# Patient Record
Sex: FEMALE | Race: WHITE
[De-identification: names, ages, dates, MRNs, and addresses within clinical notes are randomized per-mention and may not be internally consistent; named-entity substitution may affect disease eponyms.]

---

## 2020-02-14 ENCOUNTER — HOSPITAL ENCOUNTER (INPATIENT)
Dept: HOSPITAL 92 - ERS | Age: 50
LOS: 2 days | Discharge: HOME | DRG: 641 | End: 2020-02-16
Attending: INTERNAL MEDICINE | Admitting: INTERNAL MEDICINE
Payer: COMMERCIAL

## 2020-02-14 VITALS — BODY MASS INDEX: 25.2 KG/M2

## 2020-02-14 DIAGNOSIS — F41.9: ICD-10-CM

## 2020-02-14 DIAGNOSIS — Z91.040: ICD-10-CM

## 2020-02-14 DIAGNOSIS — Z90.710: ICD-10-CM

## 2020-02-14 DIAGNOSIS — E87.3: ICD-10-CM

## 2020-02-14 DIAGNOSIS — F32.9: ICD-10-CM

## 2020-02-14 DIAGNOSIS — K21.9: ICD-10-CM

## 2020-02-14 DIAGNOSIS — E83.39: ICD-10-CM

## 2020-02-14 DIAGNOSIS — D64.9: ICD-10-CM

## 2020-02-14 DIAGNOSIS — Z88.5: ICD-10-CM

## 2020-02-14 DIAGNOSIS — E03.9: ICD-10-CM

## 2020-02-14 DIAGNOSIS — Z79.899: ICD-10-CM

## 2020-02-14 DIAGNOSIS — Z91.09: ICD-10-CM

## 2020-02-14 DIAGNOSIS — Z90.49: ICD-10-CM

## 2020-02-14 DIAGNOSIS — E78.5: ICD-10-CM

## 2020-02-14 DIAGNOSIS — E87.6: Primary | ICD-10-CM

## 2020-02-14 DIAGNOSIS — R03.1: ICD-10-CM

## 2020-02-14 LAB
ALBUMIN SERPL BCG-MCNC: 4.4 G/DL (ref 3.5–5)
ALP SERPL-CCNC: 94 U/L (ref 40–110)
ALT SERPL W P-5'-P-CCNC: 20 U/L (ref 8–55)
ANION GAP SERPL CALC-SCNC: 11 MMOL/L (ref 10–20)
ANION GAP SERPL CALC-SCNC: 12 MMOL/L (ref 10–20)
AST SERPL-CCNC: 21 U/L (ref 5–34)
BASOPHILS # BLD AUTO: 0.1 THOU/UL (ref 0–0.2)
BASOPHILS NFR BLD AUTO: 0.8 % (ref 0–1)
BILIRUB SERPL-MCNC: 0.3 MG/DL (ref 0.2–1.2)
BUN SERPL-MCNC: 11 MG/DL (ref 7–18.7)
BUN SERPL-MCNC: 12 MG/DL (ref 7–18.7)
CALCIUM SERPL-MCNC: 9 MG/DL (ref 7.8–10.44)
CALCIUM SERPL-MCNC: 9.6 MG/DL (ref 7.8–10.44)
CHLORIDE SERPL-SCNC: 98 MMOL/L (ref 98–107)
CHLORIDE SERPL-SCNC: 99 MMOL/L (ref 98–107)
CO2 SERPL-SCNC: 28 MMOL/L (ref 22–29)
CO2 SERPL-SCNC: 30 MMOL/L (ref 22–29)
CREAT CL PREDICTED SERPL C-G-VRATE: 0 ML/MIN (ref 70–130)
CREAT CL PREDICTED SERPL C-G-VRATE: 0 ML/MIN (ref 70–130)
EOSINOPHIL # BLD AUTO: 0.2 THOU/UL (ref 0–0.7)
EOSINOPHIL NFR BLD AUTO: 2.4 % (ref 0–10)
GLOBULIN SER CALC-MCNC: 2.9 G/DL (ref 2.4–3.5)
GLUCOSE SERPL-MCNC: 125 MG/DL (ref 70–105)
GLUCOSE SERPL-MCNC: 92 MG/DL (ref 70–105)
HGB BLD-MCNC: 13.7 G/DL (ref 12–16)
LYMPHOCYTES # BLD: 2.7 THOU/UL (ref 1.2–3.4)
LYMPHOCYTES NFR BLD AUTO: 29.8 % (ref 21–51)
MCH RBC QN AUTO: 32.3 PG (ref 27–31)
MCV RBC AUTO: 94 FL (ref 78–98)
MONOCYTES # BLD AUTO: 0.9 THOU/UL (ref 0.11–0.59)
MONOCYTES NFR BLD AUTO: 10.1 % (ref 0–10)
NEUTROPHILS # BLD AUTO: 5.1 THOU/UL (ref 1.4–6.5)
NEUTROPHILS NFR BLD AUTO: 56.9 % (ref 42–75)
PLATELET # BLD AUTO: 346 THOU/UL (ref 130–400)
POTASSIUM SERPL-SCNC: 2.1 MMOL/L (ref 3.5–5.1)
POTASSIUM SERPL-SCNC: 2.3 MMOL/L (ref 3.5–5.1)
POTASSIUM SERPL-SCNC: 2.3 MMOL/L (ref 3.5–5.1)
POTASSIUM SERPL-SCNC: 2.6 MMOL/L (ref 3.5–5.1)
RBC # BLD AUTO: 4.24 MILL/UL (ref 4.2–5.4)
SODIUM SERPL-SCNC: 137 MMOL/L (ref 136–145)
SODIUM SERPL-SCNC: 137 MMOL/L (ref 136–145)
TROPONIN I SERPL DL<=0.01 NG/ML-MCNC: (no result) NG/ML (ref ?–0.03)
TROPONIN I SERPL DL<=0.01 NG/ML-MCNC: (no result) NG/ML (ref ?–0.03)
WBC # BLD AUTO: 9 THOU/UL (ref 4.8–10.8)

## 2020-02-14 PROCEDURE — 83735 ASSAY OF MAGNESIUM: CPT

## 2020-02-14 PROCEDURE — 71045 X-RAY EXAM CHEST 1 VIEW: CPT

## 2020-02-14 PROCEDURE — 84132 ASSAY OF SERUM POTASSIUM: CPT

## 2020-02-14 PROCEDURE — 84133 ASSAY OF URINE POTASSIUM: CPT

## 2020-02-14 PROCEDURE — 85025 COMPLETE CBC W/AUTO DIFF WBC: CPT

## 2020-02-14 PROCEDURE — 84484 ASSAY OF TROPONIN QUANT: CPT

## 2020-02-14 PROCEDURE — 82570 ASSAY OF URINE CREATININE: CPT

## 2020-02-14 PROCEDURE — 93005 ELECTROCARDIOGRAM TRACING: CPT

## 2020-02-14 PROCEDURE — 36415 COLL VENOUS BLD VENIPUNCTURE: CPT

## 2020-02-14 PROCEDURE — 84244 ASSAY OF RENIN: CPT

## 2020-02-14 PROCEDURE — 80048 BASIC METABOLIC PNL TOTAL CA: CPT

## 2020-02-14 PROCEDURE — 82436 ASSAY OF URINE CHLORIDE: CPT

## 2020-02-14 PROCEDURE — 82340 ASSAY OF CALCIUM IN URINE: CPT

## 2020-02-14 PROCEDURE — 84100 ASSAY OF PHOSPHORUS: CPT

## 2020-02-14 PROCEDURE — 82088 ASSAY OF ALDOSTERONE: CPT

## 2020-02-14 RX ADMIN — POTASSIUM CHLORIDE SCH MLS: 2 INJECTION, SOLUTION, CONCENTRATE INTRAVENOUS at 22:21

## 2020-02-14 NOTE — RAD
XR Chest 1 View Portable



HISTORY: Dyspnea



COMPARISON: 9/19/2012



FINDINGS: The heart size is normal. The lungs are well expanded without focal areas of consolidation,
 pneumothorax or pleural effusions.



IMPRESSION: No radiographic evidence of acute cardiopulmonary process.



Reported By: Renan Correa 

Electronically Signed:  2/14/2020 1:58 PM

## 2020-02-14 NOTE — HP
PRIMARY CARE PROVIDER:  Dr. Wiliam Mcguire.



CHIEF COMPLAINT:  Low potassium.



HISTORY OF PRESENT ILLNESS:  Ms. Ewelina Mccracken is a pleasant 49-year-old lady,

who was seen at [facility] on February 14, 2020, after she

was sent to the emergency room by her nephrologist. 



She reports that over the last several months, she has been feeling generalized

weakness.  She denies any other complaints.  She was seen by Nephrology Service as

outpatient and was found to have low potassium.  She had investigations done to look

into the etiology of low potassium, but so far there is no clear etiology.  She was

found to have ongoing low potassium and was advised by her nephrologist to go to the

emergency room. 



REVIEW OF SYSTEMS:  All systems were reviewed and found to be negative except for

the pertinent positives mentioned above. 



PAST MEDICAL HISTORY:  

1. Anxiety.

2. Hypothyroidism.



PAST SURGICAL HISTORY:  

1. Cholecystectomy.

2. Hysterectomy.



PSYCHIATRIC HISTORY:  

1. Anxiety.

2. Depression.



SOCIAL HISTORY:  The patient is an ex-smoker.  She denies alcohol use or

recreational drug use. 



FAMILY HISTORY:  Significant for diabetes mellitus.



ALLERGIES:  MORPHINE, ADHESIVE, AND LATEX GLOVES.



CURRENT MEDICATIONS:  

1. Pantoprazole 40 mg daily.

2. Atorvastatin 10 mg at bedtime.

3. Sertraline 50 mg daily.

4. Ranitidine 150 mg daily.

5. Bupropion 150 mg daily.



PHYSICAL EXAMINATION:

GENERAL:  On examination, Ms. Ewelina Mccracken is awake and alert, not in acute

distress. 

VITAL SIGNS:  Blood pressure is 105/67, pulse 77, respiratory rate 16, and oxygen

saturation 98% on room air.  She is afebrile. 

EYES:  No scleral icterus.  No conjunctival pallor. 

ENT:  Moist mucosal membranes.  No oropharyngeal erythema or exudates. 

NECK:  Supple, nontender.  Trachea is midline. 

RESPIRATORY:  Accessory muscles of breathing are not active.  Chest wall movements

are symmetric bilaterally.  Lungs are clear to auscultation without wheeze, rhonchi,

or crepitations. 

CARDIOVASCULAR:  S1 and S2 are heard, regular.  Peripheral pulses palpable. 

ABDOMEN:  Soft, nontender.  Bowel sounds are heard. 

NEUROLOGIC:  Cranial nerves 2 through 12 are intact. 

MUSCULOSKELETAL:  Power is 5/5 in all 4 extremities. 

SKIN:  No rashes or subcutaneous nodules. 

LYMPHATIC:  No cervical lymphadenopathy. 

PSYCHIATRIC:  Normal mood, normal affect.  The patient is oriented to person, place,

and time. 



LABORATORY AND DIAGNOSTIC DATA:  Ms. Ewelina Mccracken's labs and investigations

were reviewed. 





Electrocardiogram by my review shows normal sinus rhythm, with lateral ST-segment

depressions. 



Chest x-ray by my review does not show any pulmonary infiltrates. 



She has unremarkable CBC, normal sodium, decreased potassium of 2.1, elevated carbon

dioxide of 30, and unremarkable LFTs.  Magnesium is normal at 2.1.  Troponin-I is

normal. 



ASSESSMENT AND PLAN:  Ms. Ewelina Mccracken is a pleasant 49-year-old lady, who was

seen at [facility] on February 14, 2020.  Her problem list

includes: 

1. Hypokalemia:  Ms. Ewelina Mccracken is presenting with severe hypokalemia.  She

has received 40 mEq of potassium intravenously and 60 mEq orally.  These medications

will be continued every 3 hours, with frequently checking potassium levels.  The

treatments will be discontinued after her potassium is around 3.2.  Nephrology

Service will be consulted for opinion and help with management. 

2. Dyslipidemia:  Continue atorvastatin.

3. Anxiety and depression:  Continue bupropion and sertraline.

4. Gastroesophageal reflux disease:  Continue PPI. 

Many thanks for allowing me to participate in your patient's care.  Please feel free

to contact me with any questions or concerns. 



LEVEL OF RISK:  High.



LEVEL OF COMPLEXITY:  High.







Job ID:  [id number]

## 2020-02-15 LAB
ANION GAP SERPL CALC-SCNC: 10 MMOL/L (ref 10–20)
BASOPHILS # BLD AUTO: 0.1 THOU/UL (ref 0–0.2)
BASOPHILS NFR BLD AUTO: 0.7 % (ref 0–1)
BUN SERPL-MCNC: 9 MG/DL (ref 7–18.7)
CALCIUM SERPL-MCNC: 8.3 MG/DL (ref 7.8–10.44)
CHLORIDE SERPL-SCNC: 107 MMOL/L (ref 98–107)
CO2 SERPL-SCNC: 26 MMOL/L (ref 22–29)
CREAT CL PREDICTED SERPL C-G-VRATE: 135 ML/MIN (ref 70–130)
EOSINOPHIL # BLD AUTO: 0.2 THOU/UL (ref 0–0.7)
EOSINOPHIL NFR BLD AUTO: 3.1 % (ref 0–10)
GLUCOSE SERPL-MCNC: 96 MG/DL (ref 70–105)
HGB BLD-MCNC: 11.7 G/DL (ref 12–16)
LYMPHOCYTES # BLD: 2.2 THOU/UL (ref 1.2–3.4)
LYMPHOCYTES NFR BLD AUTO: 32.3 % (ref 21–51)
MCH RBC QN AUTO: 33.5 PG (ref 27–31)
MCV RBC AUTO: 93.6 FL (ref 78–98)
MONOCYTES # BLD AUTO: 0.8 THOU/UL (ref 0.11–0.59)
MONOCYTES NFR BLD AUTO: 11 % (ref 0–10)
NEUTROPHILS # BLD AUTO: 3.7 THOU/UL (ref 1.4–6.5)
NEUTROPHILS NFR BLD AUTO: 52.9 % (ref 42–75)
PLATELET # BLD AUTO: 278 THOU/UL (ref 130–400)
POTASSIUM SERPL-SCNC: 3 MMOL/L (ref 3.5–5.1)
POTASSIUM SERPL-SCNC: 4.2 MMOL/L (ref 3.5–5.1)
RBC # BLD AUTO: 3.49 MILL/UL (ref 4.2–5.4)
SODIUM SERPL-SCNC: 140 MMOL/L (ref 136–145)
WBC # BLD AUTO: 7 THOU/UL (ref 4.8–10.8)

## 2020-02-15 RX ADMIN — POTASSIUM CHLORIDE SCH MLS: 2 INJECTION, SOLUTION, CONCENTRATE INTRAVENOUS at 12:11

## 2020-02-15 RX ADMIN — HEPARIN SODIUM SCH UNITS: 5000 INJECTION, SOLUTION INTRAVENOUS; SUBCUTANEOUS at 20:04

## 2020-02-15 RX ADMIN — POTASSIUM CHLORIDE SCH MLS: 2 INJECTION, SOLUTION, CONCENTRATE INTRAVENOUS at 04:43

## 2020-02-15 NOTE — CON
DATE OF CONSULTATION:  



REASON FOR CONSULTATION:  Hypokalemia.



HISTORY OF PRESENTING ILLNESS:  This is a very pleasant 49-year-old female, who was

admitted to the hospital for severe hypokalemia.  The patient's potassium was 2.

The patient has had a workup done by Dr. Cameron. 



PAST MEDICAL HISTORY:  Significant for history of cholecystectomy, cortisol

insufficiency, chronic hypokalemia, Crohn disease. 



SOCIAL HISTORY:  No alcohol or drug use.



FAMILY HISTORY:  Negative for ESRD.



ALLERGIES:  REVIEWED.



HOME MEDICATION:  List reviewed.  Hospital medication reviewed.



REVIEW OF SYSTEMS:  15-point review of systems was performed, negative except for

positives noted above. 

GENERAL:   

HEAD:   

NECK:  No swelling or lumps. 

NOSE:  No epistaxis or discharge.   

EYES:  No diplopia or pain. 

RESPIRATORY:   

CARDIOVASCULAR:   

GASTROINTESTINAL:   

/GYN:   

MUSCULOSKELETAL:  No joint pain. 

NEUROPSYCHIATIC SYSTEMS:  No suicidal ideation.  No ideation. 

SKIN:  Denies any rash or ulcer. 

CONSTITUTIONAL:   No fever or chills.



PHYSICAL EXAMINATION:

CONSTITUTIONAL:  The patient is awake and alert. 

VITAL SIGNS:  Afebrile, pulse 75, breathing 16, blood pressure 130/70. 

GENERAL APPEARANCE AND MENTAL STATUS:  Fair. 

HEAD/NECK:  Normocephalic.   Atraumatic. 

EYES:  EOMI.  No deformity. 

EARS:  Clear.  No ulcers. 

NOSE:   Intact.  No lesions. 

MOUTH:  Clear.  No discharge. 

THROAT:  Clear.  No exudate. 

LUNGS:   Clear.  No crackles. 

CARDIAC:   S1, S2.  No rub. 

ABDOMEN:   Benign.  Bowel sounds positive. 

GENITALIA/RECTUM:  Cancino absent. 

BACK/EXTREMITIES:  Edema 0+. 

NEUROLOGICAL:  Alert and motor intact.                      

SKIN:   

LYMPHATICS:



LABORATORY DATA:  Labs show potassium is 2.1.



ASSESSMENT:  Hypokalemia with alkalosis as well as elevated creatinine level with

normal aldosterone level, could be possible Bartter or Gitelman syndrome versus

enzymatic deficiency for cortisol synthesis.  Basic workup for Bartter and Gitelman

is pending.  Hypertension not present.  Medication based on GFR, appropriate. 







Job ID:  [id number]

## 2020-02-15 NOTE — PDOC.HOSPP
- Subjective


Encounter Date: 02/15/20


Encounter Time: 08:00


Subjective: 





no overnight events. Feeling well and has no complaints. Continues to have 

resistant hypokalemia with pending workup.





- Objective


Vital Signs & Weight: 


 Vital Signs (12 hours)











  Temp Pulse Resp BP Pulse Ox


 


 02/15/20 11:42  98.6 F  62  18  93/47 L  99


 


 02/15/20 07:32  97.8 F  65  16  88/53 L  99








 Weight











Weight                         180 lb 11.2 oz














Result Diagrams: 


 02/15/20 02:51





 02/15/20 02:51





Hospitalist ROS





- Review of Systems


Constitutional: denies: fever, chills, sweats, weakness, malaise, other


Respiratory: denies: cough, dry, shortness of breath, hemoptysis, SOB with 

excertion, pleuritic pain, sputum, wheezing, other


Cardiovascular: denies: chest pain, palpitations, orthopnea, paroxysmal noc. 

dyspnea, edema, light headedness, other


Gastrointestinal: denies: nausea, vomiting, abdominal pain, diarrhea, 

constipation, melena, hematochezia, other


Genitourinary: denies: dysuria, frequency, incontinence, hematuria, retention, 

other


Neurological: denies: weakness, numbness, incoordination, change in speech, 

confusion, seizures, other





- Medication


Medications: 


Active Medications











Generic Name Dose Route Start Last Admin





  Trade Name Freq  PRN Reason Stop Dose Admin


 


Enoxaparin Sodium  40 mg  02/15/20 09:00  02/15/20 08:08





  Lovenox  SC   40 mg





  0900 REFUGIO   Administration





     





     





     





     


 


Ondansetron HCl  4 mg  02/15/20 04:06  02/15/20 04:16





  Zofran  SLOW IVP   4 mg





  Q4H PRN   Administration





  Nausea/Vomiting   





     





     





     














- Exam


General Appearance: NAD, awake alert


Eye: PERRL


ENT: normocephalic atraumatic, no oropharyngeal lesions, moist mucosa


Neck: no JVD


Heart: RRR, no murmur, no gallops, no rubs, normal peripheral pulses


Respiratory: CTAB, no wheezes, no rales, no ronchi, normal chest expansion, no 

tachypnea, normal percussion


Gastrointestinal: soft, non-tender, non-distended, normal bowel sounds, no 

palpable masses


Extremities: no cyanosis, no edema


Skin: normal turgor, no lesions, no rashes


Neurological: cranial nerve grossly intact, normal sensation to touch, no 

weakness, no focal deficits, no new deficit


Musculoskeletal: normal tone, normal strength, no muscle wasting


Psychiatric: normal affect, normal behavior, A&O x 3





Hosp A/P





- Plan





#resistant chronic hypokalemia


-initially presented with metabolic alkalosis, severe hypokalemia, borderline 

low BP (also currently)


-potassium continue to be low despite appropriate IV supplementation


-so far studies partially c/w bartter/gitelman; though aldosterone wnl despite 

hyperreninemia


-nephrology onboard





#anxiety


-restarted home anxiety medications





Plan:


-potassium supplementation and check q4h; also check Mg


-bed rest with assistance to ambulate


-strict I/O


-follow fluid status and HD stability; bolus / maintenance NS as necessary


-urine calcium and creatinine





dispo/code status:


full code


GI: no indication


DVT: heparin subq

## 2020-02-15 NOTE — PRG
DATE OF SERVICE:  02/15/2020



SUBJECTIVE:  A 49-year-old female being seen for hypokalemia.  The patient denies

any nausea, vomiting, or chest pain. 



OBJECTIVE:  GENERAL:  The patient is awake and alert. 

VITAL SIGNS:  Afebrile, pulse 75, breathing 16, blood pressure was 88/56. 

GENERAL APPEARANCE AND MENTAL STATUS:  Fair. 

HEAD/NECK:  Normocephalic.   Atraumatic. 

EYES:  EOMI.  No deformity. 

EARS:  Clear.  No ulcers. 

NOSE:   Intact.  No lesions. 

MOUTH:  Clear.  No discharge. 

THROAT:  Clear.  No exudate. 

LUNGS:   Clear.  No crackles. 

CARDIAC:   S1, S2.  No rub. 

ABDOMEN:   Benign.  Bowel sounds positive. 

GENITALIA/RECTUM:  Cancino absent. 

BACK/EXTREMITIES:  Edema 0+.   

NEUROLOGICAL:  Alert and motor intact.                      

SKIN:   

LYMPHATICS:   

General:  Physical examination.



LABORATORY DATA:  Reviewed.



ASSESSMENT AND PLAN:  

1. Hypokalemia, improving.  Continue aggressive potassium supplementation.

2. Metabolic alkalosis, improved.

3. Anemia, stable.

4. Medication based on GFR appropriate.







Job ID:  [id number]

## 2020-02-16 VITALS — DIASTOLIC BLOOD PRESSURE: 55 MMHG | SYSTOLIC BLOOD PRESSURE: 102 MMHG

## 2020-02-16 VITALS — TEMPERATURE: 98.1 F

## 2020-02-16 LAB
ANION GAP SERPL CALC-SCNC: 9 MMOL/L (ref 10–20)
BUN SERPL-MCNC: 5 MG/DL (ref 7–18.7)
CALCIUM 24H UR-MRATE: 26 MG/24 HR (ref 100–300)
CALCIUM SERPL-MCNC: 8.1 MG/DL (ref 7.8–10.44)
CHLORIDE SERPL-SCNC: 114 MMOL/L (ref 98–107)
CO2 SERPL-SCNC: 24 MMOL/L (ref 22–29)
CREAT CL PREDICTED SERPL C-G-VRATE: 142 ML/MIN (ref 70–130)
GLUCOSE SERPL-MCNC: 95 MG/DL (ref 70–105)
MAGNESIUM SERPL-MCNC: 2.1 MG/DL (ref 1.6–2.6)
POTASSIUM SERPL-SCNC: 3.5 MMOL/L (ref 3.5–5.1)
POTASSIUM SERPL-SCNC: 4 MMOL/L (ref 3.5–5.1)
SODIUM SERPL-SCNC: 143 MMOL/L (ref 136–145)
SPECIMEN VOL UR: 1275 ML (ref 600–1600)

## 2020-02-16 RX ADMIN — HEPARIN SODIUM SCH UNITS: 5000 INJECTION, SOLUTION INTRAVENOUS; SUBCUTANEOUS at 08:21

## 2020-02-16 NOTE — PRG
DATE OF SERVICE:  02/16/2020



SUBJECTIVE:  A 49-year-old female being seen for hypokalemia.  The patient denied

nausea, vomiting, or chest pain. 



OBJECTIVE:  CONSTITUTIONAL:  On exam, the patient is awake and alert. 

VITAL SIGNS:  Afebrile, pulse 68, breathing 16, and blood pressure 101/53. 

GENERAL APPEARANCE AND MENTAL STATUS:  Fair. 

HEAD/NECK:  Normocephalic.  Atraumatic. 

EYES:  EOMI.  No deformity. 

EARS:  Clear.  No ulcers. 

NOSE:  Intact.  No lesions. 

MOUTH:  Clear.  No discharge. 

THROAT:  Clear.  No exudate. 

LUNGS:  Clear.  No crackles. 

CARDIAC:  S1, S2.  No rub. 

ABDOMEN:  Benign.  Bowel sounds positive. 

GENITALIA/RECTUM:  Cancino absent. 

BACK/EXTREMITIES:  Edema 0+. 

NEUROLOGICAL:  Alert and motor intact. 

SKIN:   

LYMPHATICS:



LABORATORY DATA:  Hemoglobin 11.7.  Potassium is 3.5, creatinine 0.6.  Phosphorus is

2. 



ASSESSMENT AND RECOMMENDATIONS:  

1. Hypokalemia, resolved.

2. Hypophosphatemia.  Recommend phosphorus.

3. Hypocalciuria. 



Hypokalemia most likely because of some endocrinological issue.  I would recommend

tertiary care Endocrinology followup because the patient has a high renin state as

well as a relatively low aldosterone state likely an enzymatic defect __________ . 





Job ID:  [id number]

## 2020-02-17 NOTE — DIS
DATE OF ADMISSION:  02/14/2020



DATE OF DISCHARGE:  02/16/2020



HOSPITAL COURSE:  Ms. Theodore is a 49-year-old female with a medical history of

hypothyroidism, anxiety, and recurrent hypokalemia, who presented for generalized

weakness for the past several months.  On presentation, she was found to have

severely reduced potassium levels.  Nephrology was consulted and workup was started,

and it was thought that she may have Bartter or Gitelman syndromes, however, workup

showed that she has elevated renin levels with low aldosterone levels, which is

inconsistent with the syndromes and other nephrologic etiologies.  She was

supplemented with potassium, magnesium and phosphorus, and electrolytes remained

within normal limits 24 hours prior to discharge.  She was discharged on

supplemented potassium and phosphorus based on Nephrology recommendations and was

requested to make an appointment with MD Delgadillo for further endocrinologist workup

of her persistent hypokalemia.  She was discharged home hemodynamically stable and

feeling well. 



DISCHARGE PHYSICAL EXAMINATION:  GENERAL:  On exam, she was in no apparent distress.

 Alert and oriented x3. 

HEENT:  She had no thyromegaly, PERRL. 

CARDIAC:  Regular rate and rhythm.  No murmurs or gallops. 

LUNG:  Clear to auscultation bilaterally.  No wheezing, rales, or rhonchi. 

ABDOMEN:  Nondistended, nontender.  Normal bowel sounds. 

EXTREMITIES:  No edema. 

NEUROLOGIC:  Strength 5/5 throughout extremities.  Cranial nerves intact. 

PSYCHIATRIC:  Normal mood and affect.  Alert and oriented x3.



ASSESSMENT AND PLAN:  Ms. Theodore is a 49-year-old female with a medical history of

hypothyroidism, who presented with persistent hypokalemia.  Following workup and

consultation with Nephrology, the etiology seemed to be unrelated to the kidneys 

directly.  The patient was educated regarding her most likely diagnosis, and was

requested to make an appointment with an [facility] Delgadillo endocrinologist as soon as 

possible.  Meanwhile, she was discharged and requested to match input to output as

well as supplement electrolytes as directed. 







Job ID:  [id number]

## 2020-04-24 ENCOUNTER — HOSPITAL ENCOUNTER (OUTPATIENT)
Dept: HOSPITAL 92 - ERS | Age: 50
Setting detail: OBSERVATION
LOS: 1 days | Discharge: HOME | End: 2020-04-25
Attending: HOSPITALIST | Admitting: HOSPITALIST
Payer: COMMERCIAL

## 2020-04-24 VITALS — BODY MASS INDEX: 24.9 KG/M2

## 2020-04-24 DIAGNOSIS — E87.6: Primary | ICD-10-CM

## 2020-04-24 DIAGNOSIS — Z91.048: ICD-10-CM

## 2020-04-24 DIAGNOSIS — F32.9: ICD-10-CM

## 2020-04-24 DIAGNOSIS — F17.210: ICD-10-CM

## 2020-04-24 DIAGNOSIS — Z91.040: ICD-10-CM

## 2020-04-24 DIAGNOSIS — Z79.899: ICD-10-CM

## 2020-04-24 DIAGNOSIS — N18.1: ICD-10-CM

## 2020-04-24 DIAGNOSIS — Z88.5: ICD-10-CM

## 2020-04-24 DIAGNOSIS — E87.2: ICD-10-CM

## 2020-04-24 DIAGNOSIS — D63.1: ICD-10-CM

## 2020-04-24 DIAGNOSIS — K50.90: ICD-10-CM

## 2020-04-24 DIAGNOSIS — I12.9: ICD-10-CM

## 2020-04-24 LAB
ALBUMIN SERPL BCG-MCNC: 4.4 G/DL (ref 3.5–5)
ALP SERPL-CCNC: 98 U/L (ref 40–110)
ALT SERPL W P-5'-P-CCNC: 22 U/L (ref 8–55)
ANION GAP SERPL CALC-SCNC: 13 MMOL/L (ref 10–20)
ANION GAP SERPL CALC-SCNC: 14 MMOL/L (ref 10–20)
ANION GAP SERPL CALC-SCNC: 9 MMOL/L (ref 10–20)
AST SERPL-CCNC: 21 U/L (ref 5–34)
BASOPHILS # BLD AUTO: 0.1 THOU/UL (ref 0–0.2)
BASOPHILS NFR BLD AUTO: 0.9 % (ref 0–1)
BILIRUB SERPL-MCNC: 0.4 MG/DL (ref 0.2–1.2)
BUN SERPL-MCNC: 9 MG/DL (ref 7–18.7)
CALCIUM SERPL-MCNC: 8.8 MG/DL (ref 7.8–10.44)
CALCIUM SERPL-MCNC: 9.3 MG/DL (ref 7.8–10.44)
CALCIUM SERPL-MCNC: 9.4 MG/DL (ref 7.8–10.44)
CHLORIDE SERPL-SCNC: 103 MMOL/L (ref 98–107)
CHLORIDE SERPL-SCNC: 105 MMOL/L (ref 98–107)
CHLORIDE SERPL-SCNC: 107 MMOL/L (ref 98–107)
CO2 SERPL-SCNC: 23 MMOL/L (ref 22–29)
CO2 SERPL-SCNC: 25 MMOL/L (ref 22–29)
CO2 SERPL-SCNC: 27 MMOL/L (ref 22–29)
CREAT CL PREDICTED SERPL C-G-VRATE: 0 ML/MIN (ref 70–130)
CREAT CL PREDICTED SERPL C-G-VRATE: 123 ML/MIN (ref 70–130)
CREAT CL PREDICTED SERPL C-G-VRATE: 123 ML/MIN (ref 70–130)
EOSINOPHIL # BLD AUTO: 0.2 THOU/UL (ref 0–0.7)
EOSINOPHIL NFR BLD AUTO: 2.2 % (ref 0–10)
GLOBULIN SER CALC-MCNC: 2.9 G/DL (ref 2.4–3.5)
GLUCOSE SERPL-MCNC: 113 MG/DL (ref 70–105)
GLUCOSE SERPL-MCNC: 131 MG/DL (ref 70–105)
GLUCOSE SERPL-MCNC: 88 MG/DL (ref 70–105)
HGB BLD-MCNC: 13.5 G/DL (ref 12–16)
LYMPHOCYTES # BLD: 2.7 THOU/UL (ref 1.2–3.4)
LYMPHOCYTES NFR BLD AUTO: 29.8 % (ref 21–51)
MAGNESIUM SERPL-MCNC: 2.3 MG/DL (ref 1.6–2.6)
MCH RBC QN AUTO: 32.2 PG (ref 27–31)
MCV RBC AUTO: 94.2 FL (ref 78–98)
MONOCYTES # BLD AUTO: 0.8 THOU/UL (ref 0.11–0.59)
MONOCYTES NFR BLD AUTO: 8.4 % (ref 0–10)
NEUTROPHILS # BLD AUTO: 5.4 THOU/UL (ref 1.4–6.5)
NEUTROPHILS NFR BLD AUTO: 58.7 % (ref 42–75)
PLATELET # BLD AUTO: 352 THOU/UL (ref 130–400)
POTASSIUM SERPL-SCNC: 2.4 MMOL/L (ref 3.5–5.1)
POTASSIUM SERPL-SCNC: 3.1 MMOL/L (ref 3.5–5.1)
POTASSIUM SERPL-SCNC: 3.1 MMOL/L (ref 3.5–5.1)
RBC # BLD AUTO: 4.19 MILL/UL (ref 4.2–5.4)
SODIUM SERPL-SCNC: 138 MMOL/L (ref 136–145)
SODIUM SERPL-SCNC: 140 MMOL/L (ref 136–145)
SODIUM SERPL-SCNC: 140 MMOL/L (ref 136–145)
SODIUM UR-SCNC: (no result) MMOL/L
WBC # BLD AUTO: 9.2 THOU/UL (ref 4.8–10.8)

## 2020-04-24 PROCEDURE — 93005 ELECTROCARDIOGRAM TRACING: CPT

## 2020-04-24 PROCEDURE — 80048 BASIC METABOLIC PNL TOTAL CA: CPT

## 2020-04-24 PROCEDURE — 96366 THER/PROPH/DIAG IV INF ADDON: CPT

## 2020-04-24 PROCEDURE — 96375 TX/PRO/DX INJ NEW DRUG ADDON: CPT

## 2020-04-24 PROCEDURE — 85025 COMPLETE CBC W/AUTO DIFF WBC: CPT

## 2020-04-24 PROCEDURE — 82088 ASSAY OF ALDOSTERONE: CPT

## 2020-04-24 PROCEDURE — 82570 ASSAY OF URINE CREATININE: CPT

## 2020-04-24 PROCEDURE — 36415 COLL VENOUS BLD VENIPUNCTURE: CPT

## 2020-04-24 PROCEDURE — 82436 ASSAY OF URINE CHLORIDE: CPT

## 2020-04-24 PROCEDURE — 84300 ASSAY OF URINE SODIUM: CPT

## 2020-04-24 PROCEDURE — 83735 ASSAY OF MAGNESIUM: CPT

## 2020-04-24 PROCEDURE — 84244 ASSAY OF RENIN: CPT

## 2020-04-24 PROCEDURE — 82533 TOTAL CORTISOL: CPT

## 2020-04-24 PROCEDURE — G0378 HOSPITAL OBSERVATION PER HR: HCPCS

## 2020-04-24 PROCEDURE — 96372 THER/PROPH/DIAG INJ SC/IM: CPT

## 2020-04-24 PROCEDURE — 84443 ASSAY THYROID STIM HORMONE: CPT

## 2020-04-24 PROCEDURE — 96365 THER/PROPH/DIAG IV INF INIT: CPT

## 2020-04-24 PROCEDURE — 84100 ASSAY OF PHOSPHORUS: CPT

## 2020-04-24 RX ADMIN — BUPROPION HYDROCHLORIDE SCH MG: 150 TABLET, FILM COATED, EXTENDED RELEASE ORAL at 20:24

## 2020-04-24 NOTE — CON
DATE OF CONSULTATION:  



REASON FOR CONSULTATION:  Hypokalemia.



HISTORY OF PRESENT ILLNESS:  A very pleasant 49-year-old female, who presented to

the hospital with weakness and a potassium of 2.  The patient has been admitted.

The patient has a history of cortisol insufficiency and chronic hypokalemia.  The

patient denies nausea, vomiting, or chest pain. 



PAST MEDICAL HISTORY:  Cholecystectomy, cortisol insufficiency, chronic __________,

and Crohn disease. 



SOCIAL HISTORY:  No alcohol or drug use.



ALLERGIES:  REVIEWED.



MEDICATIONS:  Home medications, list reviewed.  Hospital medications, list reviewed.



FAMILY HISTORY:  Negative for ESRD.



REVIEW OF SYSTEMS:  15-point review of systems was performed and negative except for

positives noted above. 

HEENT:  Eyes intact, no diplopia.  Ears:  No hearing loss or earache.  Nose:  No

discharge or bleeding. 

Chest:  No cough or phlegm. 

Abdomen:  No nausea or vomiting. 

Genitourinary:  No hematuria.  No Cancino catheter. 

Musculoskeletal:  No low back pain.  No joint swelling or pain. 

Neurological:  No syncope.  No seizures. 

Skin:  No complaints of rash or itching. 

Psychiatric:  No depression. 

Constitutional:  No weight loss or loss of appetite.



PHYSICAL EXAMINATION:

GENERAL:  The patient is awake and alert. 

VITAL SIGNS:  Afebrile, pulse 80, breathing 16, and blood pressure __________. 

HEENT:  Head normocephalic and atraumatic.  Eyes intact, no ulcers.  Nose intact, no

ulcers.  Ears intact, no ulcers. 

Neck:  Supple.  No JVD. 

Chest:  Symmetrical and clear. 

Cardiovascular:  Shows S1 and S2, no rub, no murmur. 

Gastrointestinal:  Abdomen is soft, bowel sounds positive. 

Extremities:  Show no edema or ulcers. 

Skin:  Shows no rash or petechiae. 

Musculoskeletal:  Shows no joint swelling or stiffness. 

Genitourinary:  Shows no Cancino or CVA tenderness. 

Neurologic:  Motor intact.  Cranial nerves intact.



LABORATORY DATA:  Showed potassium is 2.4.



ASSESSMENT:  

1. Chronic kidney disease, stage 1, stable.

2. Hypokalemia.  Recommend aggressive potassium replacement.

3. Hypertension, stable.

4. We would also recommend checking magnesium.







Job ID:  [id number]

## 2020-04-25 VITALS — TEMPERATURE: 98 F | SYSTOLIC BLOOD PRESSURE: 98 MMHG | DIASTOLIC BLOOD PRESSURE: 68 MMHG

## 2020-04-25 LAB
ANION GAP SERPL CALC-SCNC: 12 MMOL/L (ref 10–20)
BASOPHILS # BLD AUTO: 0.1 THOU/UL (ref 0–0.2)
BASOPHILS NFR BLD AUTO: 1 % (ref 0–1)
BUN SERPL-MCNC: 6 MG/DL (ref 7–18.7)
CALCIUM SERPL-MCNC: 8.5 MG/DL (ref 7.8–10.44)
CHLORIDE SERPL-SCNC: 111 MMOL/L (ref 98–107)
CO2 SERPL-SCNC: 19 MMOL/L (ref 22–29)
CREAT CL PREDICTED SERPL C-G-VRATE: 143 ML/MIN (ref 70–130)
EOSINOPHIL # BLD AUTO: 0.2 THOU/UL (ref 0–0.7)
EOSINOPHIL NFR BLD AUTO: 4 % (ref 0–10)
GLUCOSE SERPL-MCNC: 87 MG/DL (ref 70–105)
HGB BLD-MCNC: 11.6 G/DL (ref 12–16)
LYMPHOCYTES # BLD: 2.2 THOU/UL (ref 1.2–3.4)
LYMPHOCYTES NFR BLD AUTO: 36.5 % (ref 21–51)
MCH RBC QN AUTO: 32.1 PG (ref 27–31)
MCV RBC AUTO: 96.9 FL (ref 78–98)
MONOCYTES # BLD AUTO: 0.6 THOU/UL (ref 0.11–0.59)
MONOCYTES NFR BLD AUTO: 9.4 % (ref 0–10)
NEUTROPHILS # BLD AUTO: 3 THOU/UL (ref 1.4–6.5)
NEUTROPHILS NFR BLD AUTO: 49.2 % (ref 42–75)
PLATELET # BLD AUTO: 285 THOU/UL (ref 130–400)
POTASSIUM SERPL-SCNC: 3.6 MMOL/L (ref 3.5–5.1)
RBC # BLD AUTO: 3.62 MILL/UL (ref 4.2–5.4)
SODIUM SERPL-SCNC: 138 MMOL/L (ref 136–145)
WBC # BLD AUTO: 6 THOU/UL (ref 4.8–10.8)

## 2020-04-25 RX ADMIN — BUPROPION HYDROCHLORIDE SCH MG: 150 TABLET, FILM COATED, EXTENDED RELEASE ORAL at 07:56

## 2020-04-25 NOTE — HP
CHIEF COMPLAINT:  Generalized body aches and pains and abnormal labs.



HISTORY OF PRESENT ILLNESS:  The patient is a 49-year-old female, who was sent from

her nephrologist's office for a low potassium.  The patient states that she has a

history of low potassium, this has been ongoing for the past few years now.  She has

had multiple workups, which have not really found the etiology of her low potassium.

 She has been seeing Nephrology for the past year and also has been following up

with Endocrinology.  She denies any shortness of breath.  She did have some chest

tightness today, however, currently does not have any.  She has been complaining of

generalized fatigue and also feels at times that she cannot focus and has some loss

of words at times.  Denies any sick contacts or any diarrhea.  Denies taking any

over-the-counter medications. 



PAST MEDICAL HISTORY:  She has a history of depression and low potassium or

hypokalemia. 



FAMILY HISTORY:  None.



PAST SURGICAL HISTORY:  She has had a cholecystectomy and radical hysterectomy.  She

has also had anal fistula removed. 



SOCIAL HISTORY:  She smokes about 5 cigarettes a day.  Occasional alcohol use.

Denies any drug use.  She is a full code. 



REVIEW OF SYSTEMS:  All negative except for the ones mentioned in the HPI.



PHYSICAL EXAMINATION:

VITAL SIGNS:  Temperature 98.2, pulse 80, respirations 20, 98% on room air, blood

pressure 102/67. 

GENERAL:  She is awake, alert, and oriented x3.  Does not appear in distress. 

CV:  S1 and S2 present.  No murmurs, rubs, or gallops. 

ABDOMEN:  Soft and nontender.  Bowel sounds are present x2. 

EXTREMITIES:  No edema.  Pedal pulses present x2. 

NEUROVASCULAR:  No focal deficits noted. 

SKIN:  No cuts, lesions, or bruises noted.



LABORATORY DATA:  WBC of 9.2, hemoglobin of 13.5, hematocrit of 39.5, platelets of

352.  Chemistry; sodium of 138, potassium of 2.4, BUN of 9, creatinine 0.69.  LFTs

are normal.  EKG did not show any acute abnormalities. 



ASSESSMENT AND PLAN:  The patient is a 49-year-old female, who presents to the

hospital with hypokalemia. 

1. Hypokalemia, unclear etiology.  The patient states that she has had a 24-hour

potassium collection.  Also, she recently had a serum cortisol, I have repeated

however her serum cortisol, this was done on 04/16, it was 8.80 and also she has had

an ACTH, which was 7.7, was within normal limits.  I will also add a TSH for the

morning.  She is currently getting potassium replacement.  Her magnesium was normal.

 The patient has had a CAT scan done according to Nephrology and this was done, I

believe, as an outpatient and according to the nephrologist, it was normal.  She was

supposed to follow up with MD Delgadillo, however, unable to do so since the MD Delgadillo did not accept the patient's insurance.  We will replace the potassium and

she also had a workup for Bartter and Gitelman syndrome, which was also negative. 

2. Depression.  We will continue her home medications.  I did evaluate some of her

medications, nothing that would cause her potassium to be that low.  We will recheck

her renin and aldosterone and also, we will check a random urine sodium, urine

chloride, urine creatinine per Nephrology recommendation.  We will also consult

Nephrology. 







Job ID:  [id number]

## 2020-04-25 NOTE — PRG
DATE OF SERVICE:  04/25/2020



SUBJECTIVE:  A 49-year-old female, being seen for hypokalemia.  The patient denies

any nausea, vomiting, or chest pain. 



OBJECTIVE:  GENERAL:  The patient is awake and alert. 

VITAL SIGNS:  Afebrile, pulse 75, breathing 16, blood pressure was 98/68. 

HEENT:  Head normocephalic and atraumatic.  Eyes intact, no ulcers.  Nose intact, no

ulcers.  Ears intact, no ulcers. 

Neck:  Supple.  No JVD. 

Chest:  Symmetrical and clear. 

Cardiovascular:  Shows S1 and S2, no rub, no murmur. 

Gastrointestinal:  Abdomen is soft, bowel sounds positive. 

Extremities:  Show no edema or ulcers. 

Skin:  Shows no rash or petechiae. 

Musculoskeletal:  Shows no joint swelling or stiffness. 

Genitourinary:  Shows no Cancino or CVA tenderness. 

Neurologic:  Motor intact.  Cranial nerves intact.



LABORATORY DATA:  Labs show potassium 3.6.



ASSESSMENT AND PLAN:  

1. Stage 1 chronic kidney disease, stable.

2. Hypertension, stable.

3. Anemia, stable. 

Medication based on GFR appropriate.  The patient will follow up with Dr. Cameron in

1 week. 







Job ID:  [id number]

## 2020-04-27 NOTE — DIS
DATE OF ADMISSION:  04/24/2020



DATE OF DISCHARGE:  04/25/2020



DISCHARGE DISPOSITION:  Home.



PRIMARY DISCHARGE DIAGNOSES:  Hypokalemia, metabolic acidosis, depression.



PROCEDURES DONE DURING HOSPITALIZATION:  H and H 11 and 35, platelet count 285.  Had

a potassium of 2.4 on admission, discharge numbers of 3.6.  BUN 6, creatinine 0.6,

serum bicarb 19.  TSH 1.23.  Serum cortisol this morning was 11. 



DISCHARGE PLAN:  The patient to follow up with Dr. Amna Pardo, endocrinologist

in 10 days; primary care physician, Dr. Mcguire in 1 week; and Dr. Cameron in 1

week. 



BRIEF COURSE DURING HOSPITALIZATION:  The patient initially was sent over from her

nephrologist's office with complaints of generalized body pains and aches and low

potassium.  The patient has had known history of hypokalemia for last 1 year and has

been having multiple workups done.  She has recently seen an endocrinologist in

town. The endocrinologist is coordinating with her nephrologist to find a cause for

her hypokalemia.  The patient has been advised to follow up with her endocrinologist

in 10 days.  Her potassium was replaced and she is hemodynamically stable.  Please

note I have seen and examined the patient on the day of discharge.  She is

ambulating and eating well.  No nausea or vomiting.  She is wanting to go home and

will be shortly discharged home. 







Job ID:  [id number]

## 2020-06-12 ENCOUNTER — HOSPITAL ENCOUNTER (OUTPATIENT)
Dept: HOSPITAL 57 - BURRAD | Age: 50
Discharge: HOME | End: 2020-06-12
Payer: COMMERCIAL

## 2020-06-12 ENCOUNTER — HOSPITAL ENCOUNTER (INPATIENT)
Dept: HOSPITAL 92 - ERS | Age: 50
LOS: 1 days | Discharge: HOME | DRG: 641 | End: 2020-06-13
Attending: INTERNAL MEDICINE | Admitting: INTERNAL MEDICINE
Payer: COMMERCIAL

## 2020-06-12 VITALS — BODY MASS INDEX: 26.2 KG/M2

## 2020-06-12 DIAGNOSIS — Z88.8: ICD-10-CM

## 2020-06-12 DIAGNOSIS — Z88.5: ICD-10-CM

## 2020-06-12 DIAGNOSIS — Z91.040: ICD-10-CM

## 2020-06-12 DIAGNOSIS — Z90.49: ICD-10-CM

## 2020-06-12 DIAGNOSIS — K21.9: ICD-10-CM

## 2020-06-12 DIAGNOSIS — F17.210: ICD-10-CM

## 2020-06-12 DIAGNOSIS — Z79.899: ICD-10-CM

## 2020-06-12 DIAGNOSIS — Z90.710: ICD-10-CM

## 2020-06-12 DIAGNOSIS — N18.1: ICD-10-CM

## 2020-06-12 DIAGNOSIS — E87.6: Primary | ICD-10-CM

## 2020-06-12 DIAGNOSIS — Z87.891: ICD-10-CM

## 2020-06-12 DIAGNOSIS — I10: ICD-10-CM

## 2020-06-12 DIAGNOSIS — F32.9: ICD-10-CM

## 2020-06-12 DIAGNOSIS — E87.3: ICD-10-CM

## 2020-06-12 LAB
ALBUMIN SERPL BCG-MCNC: 4 G/DL (ref 3.5–5)
ALP SERPL-CCNC: 80 U/L (ref 40–110)
ALT SERPL W P-5'-P-CCNC: 18 U/L (ref 8–55)
ANION GAP SERPL CALC-SCNC: 11 MMOL/L (ref 10–20)
ANION GAP SERPL CALC-SCNC: 12 MMOL/L (ref 10–20)
AST SERPL-CCNC: 18 U/L (ref 5–34)
BASOPHILS # BLD AUTO: 0.1 THOU/UL (ref 0–0.2)
BASOPHILS NFR BLD AUTO: 0.8 % (ref 0–1)
BILIRUB SERPL-MCNC: 0.3 MG/DL (ref 0.2–1.2)
BUN SERPL-MCNC: 7 MG/DL (ref 7–18.7)
BUN SERPL-MCNC: 7 MG/DL (ref 7–18.7)
CALCIUM SERPL-MCNC: 9 MG/DL (ref 7.8–10.44)
CALCIUM SERPL-MCNC: 9.2 MG/DL (ref 7.8–10.44)
CHLORIDE SERPL-SCNC: 100 MMOL/L (ref 98–107)
CHLORIDE SERPL-SCNC: 101 MMOL/L (ref 98–107)
CO2 SERPL-SCNC: 29 MMOL/L (ref 22–29)
CO2 SERPL-SCNC: 31 MMOL/L (ref 22–29)
CREAT CL PREDICTED SERPL C-G-VRATE: 0 ML/MIN (ref 70–130)
CREAT CL PREDICTED SERPL C-G-VRATE: 0 ML/MIN (ref 70–130)
EOSINOPHIL # BLD AUTO: 0.2 THOU/UL (ref 0–0.7)
EOSINOPHIL NFR BLD AUTO: 2.7 % (ref 0–10)
GLOBULIN SER CALC-MCNC: 2.7 G/DL (ref 2.4–3.5)
GLUCOSE SERPL-MCNC: 115 MG/DL (ref 70–105)
GLUCOSE SERPL-MCNC: 94 MG/DL (ref 70–105)
HGB BLD-MCNC: 12.5 G/DL (ref 12–16)
LYMPHOCYTES # BLD: 2.3 THOU/UL (ref 1.2–3.4)
LYMPHOCYTES NFR BLD AUTO: 28.3 % (ref 21–51)
MCH RBC QN AUTO: 32.4 PG (ref 27–31)
MCV RBC AUTO: 94.6 FL (ref 78–98)
MONOCYTES # BLD AUTO: 0.7 THOU/UL (ref 0.11–0.59)
MONOCYTES NFR BLD AUTO: 8.8 % (ref 0–10)
NEUTROPHILS # BLD AUTO: 4.8 THOU/UL (ref 1.4–6.5)
NEUTROPHILS NFR BLD AUTO: 59.4 % (ref 42–75)
PLATELET # BLD AUTO: 308 THOU/UL (ref 130–400)
POTASSIUM SERPL-SCNC: 2.3 MMOL/L (ref 3.5–5.1)
POTASSIUM SERPL-SCNC: 2.5 MMOL/L (ref 3.5–5.1)
POTASSIUM UR-SCNC: (no result) MMOL/L
RBC # BLD AUTO: 3.86 MILL/UL (ref 4.2–5.4)
SODIUM SERPL-SCNC: 139 MMOL/L (ref 136–145)
SODIUM SERPL-SCNC: 140 MMOL/L (ref 136–145)
SODIUM UR-SCNC: (no result) MMOL/L
WBC # BLD AUTO: 8 THOU/UL (ref 4.8–10.8)

## 2020-06-12 PROCEDURE — 80048 BASIC METABOLIC PNL TOTAL CA: CPT

## 2020-06-12 PROCEDURE — 84484 ASSAY OF TROPONIN QUANT: CPT

## 2020-06-12 PROCEDURE — 36415 COLL VENOUS BLD VENIPUNCTURE: CPT

## 2020-06-12 PROCEDURE — 84133 ASSAY OF URINE POTASSIUM: CPT

## 2020-06-12 PROCEDURE — 96365 THER/PROPH/DIAG IV INF INIT: CPT

## 2020-06-12 PROCEDURE — 85025 COMPLETE CBC W/AUTO DIFF WBC: CPT

## 2020-06-12 PROCEDURE — 96367 TX/PROPH/DG ADDL SEQ IV INF: CPT

## 2020-06-12 PROCEDURE — 93005 ELECTROCARDIOGRAM TRACING: CPT

## 2020-06-12 PROCEDURE — 82570 ASSAY OF URINE CREATININE: CPT

## 2020-06-12 PROCEDURE — 83735 ASSAY OF MAGNESIUM: CPT

## 2020-06-12 PROCEDURE — 84300 ASSAY OF URINE SODIUM: CPT

## 2020-06-12 PROCEDURE — 82436 ASSAY OF URINE CHLORIDE: CPT

## 2020-06-12 PROCEDURE — 71046 X-RAY EXAM CHEST 2 VIEWS: CPT

## 2020-06-12 RX ADMIN — BUPROPION HYDROCHLORIDE SCH MG: 150 TABLET, FILM COATED, EXTENDED RELEASE ORAL at 20:59

## 2020-06-12 RX ADMIN — SODIUM CHLORIDE AND POTASSIUM CHLORIDE SCH MLS: 9; 2.98 INJECTION, SOLUTION INTRAVENOUS at 20:57

## 2020-06-12 RX ADMIN — SODIUM CHLORIDE AND POTASSIUM CHLORIDE SCH: 9; 2.98 INJECTION, SOLUTION INTRAVENOUS at 17:42

## 2020-06-12 NOTE — HP
PRIMARY CARE PHYSICIAN:  Wiliam Mcguire MD



ENDOCRINOLOGIST:  Cosme Chappell MD



NEPHROLOGIST:  Jered Cameron MD



CHIEF COMPLAINT:  My potassium level is low.



HISTORY OF PRESENT ILLNESS:  Ms. Theodore is a very pleasant 49-year-old female, who

has a history of hypokalemia, which she says was diagnosed about 2 years ago.  She

recently had a flare up of the hypokalemia and was admitted to the hospital, where

her potassium was corrected and then she was discharged.  She says that she was told

and has been taking the potassium chloride 20 mEq eight times a day and went to have

lab work done in anticipation for her followup with Dr. Chappell.  She was called and

told that her potassium was low and that she needed to come to the hospital.  When

she arrived to the ER, potassium level was drawn and it was found to be 2.5, and she

is being admitted for replacement.  She says that her only symptoms are just feeling

exhausted.  She denies any cramping in her muscles.  She generally feels weak but is

able to stand from a seated position and generally move around without any

difficulty.  She denies any palpitations and denies any chest pain, but did have

chest pain a few weeks ago. 



REVIEW OF SYSTEMS:  All systems were reviewed and are negative except for that

mentioned in the history of present illness. 



PAST MEDICAL HISTORY:  Significant for depression and hypokalemia.



PAST SURGICAL HISTORY:  She has had a cholecystectomy, hysterectomy.  She had an AV

fistula placed and then removed. 



ALLERGIES:  TO MORPHINE, LASIX, AND ADHESIVES.



SOCIAL HISTORY:  She is .  She is a full code.  She occasionally takes

alcohol.  She smokes about five cigarettes a day. 



FAMILY HISTORY:  Significant for diabetes and hypertension, and she said her mother

had colon cancer. 



CURRENT MEDICATIONS:  Include:

1. Pantoprazole 40 mg daily.

2. Atorvastatin 10 mg daily.

3. Sertraline 50 mg daily.

4. Bupropion 150 mg once daily.

5. Potassium chloride 20 mEq, she says eight times a day.



PHYSICAL EXAMINATION:

GENERAL:  She is alert and oriented x4, in no acute distress. 

VITAL SIGNS:  Blood pressure was 121/71, heart rate 72, respiratory rate of 18,

temperature is 98, and O2 saturation is 98% on room air. 

HEENT:  Pupils are equal, round, and reactive.  Extraocular muscles are intact.  Her

sclerae are anicteric.  Throat, there is no erythema, no exudates. 

NECK:  No adenopathy.  No bruits. 

LUNGS:  Clear to auscultation.  There is no wheezing, no rales, no rhonchi. 

CARDIOVASCULAR:  She has a normal S1, S2.  There is no S3 or S4.  No murmurs,

clicks, or rubs. 

ABDOMEN:  Soft, nontender, and nondistended.  Positive for bowel sounds.  There is

no rebound, no guarding, no organomegaly. 

EXTREMITIES:  There is no edema.  No calf tenderness.  No joint effusions. 

NEUROLOGIC:  Nonfocal. 

SKIN AND INTEGUMENT:  No skin changes.  No rash.



LABORATORY RESULTS:  White blood cell count 8, hemoglobin 12.5, hematocrit is 36.5,

and platelet count was 308.  Sodium 139, potassium 2.5, chloride is 100, CO2 is 21,

BUN of 7, creatinine 0.74, glucose is 115. 



ASSESSMENT:  This is a very pleasant 49-year-old female, who has a history of

hypokalemia.  She is being admitted for recurrence.  The etiology of which is

unclear and still under investigation.  She will be started on IV fluids with

potassium supplementation and will do this both IV and orally.  Nephrology has been

consulted and further recommendations to follow. 





Job ID:  [id number]

## 2020-06-12 NOTE — RAD
CHEST TWO VIEWS:

 

Date: 6-

 

Comparison: 2-

 

FINDINGS: 

The heart is normal in size. There are no effusions or signs of vascular congestion. 

 

There is no major lobar infiltrate, however, there is some minimal linear streaking in the lingula ne
ar the cardiac apex and a questionable area of density in the right middle lobe. What appears to be a
 cardiac fat pad is seen in the right cardiophrenic angle. Normally I would completely dismiss it, ho
wever, looking at the film done four months ago I see no evidence of such. Thus, depending upon the p
lizzeth's symptoms, there may need to be follow up imaging depending upon her progress. 

 

IMPRESSION: 

Minor areas of streaking in the lingula and right middle lobe whose significance is unsure. See comme
nts above regarding right cardiophrenic angle. Consider follow up chest x-ray or CT if symptoms do no
t improve. 

 

Code T

 

POS: HOME

## 2020-06-12 NOTE — CON
DATE OF CONSULTATION:  06/12/2020



CONSULTING PHYSICIAN:  Dr. Meghana Hernandez.



REASON FOR CONSULTATION:  Hypokalemia.



REASON FOR ADMISSION:  Abnormal labs.



HISTORY OF PRESENT ILLNESS:  This is a 49-year-old female with history of

depression, hypokalemia, came to the hospital with abnormal labs.  She had lab work

done.  Her potassium was 2.3.  She had been on workup for hypokalemia, which was

found to be nonrenal in cause, but we will repeat some labs.  The patient denies any

symptoms.  She has been taking potassium 4 times a day and reports no GI symptoms. 



PAST MEDICAL HISTORY:  Positive for depression and hypokalemia.



PAST SURGICAL HISTORY:  Cholecystectomy and hysterectomy.



HOME MEDICATIONS:  Reviewed.



ALLERGIES:  LATEX, ADHESIVE, AND MORPHINE.



SOCIAL HISTORY:  She smokes 5 cigarettes per day.  Occasional alcohol use.



FAMILY HISTORY:  No history of kidney disease.



REVIEW OF SYSTEMS:  CONSTITUTIONAL:  Negative for weight loss or gain, ability to

conduct usual activities. 

SKIN:  Negative for rash, itching. 

EYES:  Negative for double vision, pain. 

ENT/MOUTH:  Negative for nose bleeding, neck stiffness, pain, tenderness. 

CARDIOVASCULAR:  Negative for palpitations, dyspnea on exertion, orthopnea. 

RESPIRATORY:  Negative for shortness of breath, wheezing, cough, hemoptysis, fever

or night sweats. 

GASTROINTESTINAL:  Negative for poor appetite, abdominal pain, heartburn, nausea,

vomiting, constipation, or diarrhea. 

GENITOURINARY:  Negative for urgency, frequency, dysuria, nocturia. 

MUSCULOSKELETAL:  Negative for pain, swelling. 

NEUROLOGIC/PSYCHIATRIC:  Negative for anxiety, depression. 

ALLERGY/IMMUNOLOGIC:  Negative for skin rash, bleeding tendency.



PHYSICAL EXAMINATION:

GENERAL:  This is a thin built female, in no apparent distress. 

VITAL SIGNS:  Reviewed. 

HEENT:  Atraumatic, normocephalic.  Oral mucosa is moist. 

NECK:  Supple. 

CV:  S1 and S2, rate normal. 

RESPIRATORY:  Clear. 

GI:  Abdomen is soft. 

MUSCULOSKELETAL:  No edema. 

DERMATOLOGIC:  No skin rash. 

NEUROLOGIC:  Alert and awake. 

PSYCHIATRIC:  Mood and affect normal.



LABORATORY DATA:  Hemoglobin is 12.5.  Potassium 2.5, bicarb is 31, BUN 7,

creatinine is 0.7. 



ASSESSMENT AND PLAN:  

1. Hypokalemia.  We will check urine studies.  The last few times, the urine study

suggested nonrenal cause.  We will recheck again. 

2. Alkalosis.  We will check urine chloride too.

3. Edema, controlled.

4. Hypertension.

5. Plan is to replace potassium and recheck urine study.  We will continue to

follow. 



Thank you for the consult.  The patient was seen in the ER.





Job ID:  [id number]

## 2020-06-13 VITALS — SYSTOLIC BLOOD PRESSURE: 103 MMHG | TEMPERATURE: 98.2 F | DIASTOLIC BLOOD PRESSURE: 55 MMHG

## 2020-06-13 LAB
ANION GAP SERPL CALC-SCNC: 10 MMOL/L (ref 10–20)
BASOPHILS # BLD AUTO: 0 THOU/UL (ref 0–0.2)
BASOPHILS NFR BLD AUTO: 0.8 % (ref 0–1)
BUN SERPL-MCNC: 5 MG/DL (ref 7–18.7)
CALCIUM SERPL-MCNC: 7.8 MG/DL (ref 7.8–10.44)
CHLORIDE SERPL-SCNC: 110 MMOL/L (ref 98–107)
CO2 SERPL-SCNC: 23 MMOL/L (ref 22–29)
CREAT CL PREDICTED SERPL C-G-VRATE: 151 ML/MIN (ref 70–130)
EOSINOPHIL # BLD AUTO: 0.2 THOU/UL (ref 0–0.7)
EOSINOPHIL NFR BLD AUTO: 4.1 % (ref 0–10)
GLUCOSE SERPL-MCNC: 89 MG/DL (ref 70–105)
HGB BLD-MCNC: 10.1 G/DL (ref 12–16)
LYMPHOCYTES # BLD: 2 THOU/UL (ref 1.2–3.4)
LYMPHOCYTES NFR BLD AUTO: 35.1 % (ref 21–51)
MCH RBC QN AUTO: 30.2 PG (ref 27–31)
MCV RBC AUTO: 94.8 FL (ref 78–98)
MONOCYTES # BLD AUTO: 0.5 THOU/UL (ref 0.11–0.59)
MONOCYTES NFR BLD AUTO: 9.4 % (ref 0–10)
NEUTROPHILS # BLD AUTO: 2.9 THOU/UL (ref 1.4–6.5)
NEUTROPHILS NFR BLD AUTO: 50.6 % (ref 42–75)
PLATELET # BLD AUTO: 270 THOU/UL (ref 130–400)
POTASSIUM SERPL-SCNC: 3.4 MMOL/L (ref 3.5–5.1)
RBC # BLD AUTO: 3.34 MILL/UL (ref 4.2–5.4)
SODIUM SERPL-SCNC: 140 MMOL/L (ref 136–145)
WBC # BLD AUTO: 5.7 THOU/UL (ref 4.8–10.8)

## 2020-06-13 RX ADMIN — BUPROPION HYDROCHLORIDE SCH MG: 150 TABLET, FILM COATED, EXTENDED RELEASE ORAL at 08:32

## 2020-06-13 NOTE — PRG
DATE OF SERVICE:  06/13/2020



SUBJECTIVE:  Patient was seen and examined at bedside and overnight events noted. 



Patient denies any shortness of breath or chest pain or palpitation. 



No history of nausea or vomiting or diarrhea or fever or chills or cramps.



OBJECTIVE:  GENERAL:  This is a well built female, in no apparent distress. 

VITAL SIGNS:  Temperature 98.3.  Heart rate 70.  Respiratory rate 18.  Blood

pressure 109/56. 

HEENT:  Atraumatic, normocephalic.  Oral mucosa is moist 

NECK:  Supple. 

CARDIOVASCULAR:  S1, S2 heard.  Rate and rhythm regular. 

RESPIRATORY:  Clear to auscultation. 

GASTROINTESTINAL:  Abdomen is soft. 

MUSCULOSKELETAL:  No tenderness.  No edema. 

DERMATOLOGIC:  No skin rash. 

NEUROLOGIC:  Alert and awake and oriented X3.  No focal neurologic deficits. Moving

all the extremities. 

PSYCHIATRIC:  Mood and affect normal.



LABORATORY DATA:  Potassium is 3.4, BUN is 5, creatinine 0.59.



ASSESSMENT AND PLAN:  

1. Hypokalemia, replace.

2. Alkalosis.

3. Edema.

4. Hypertension. 



Hypokalemia is better.  Urine studies suggest no renal loss of potassium, might need

a GI consult.  The patient has complained of some GI upset.  She has followed up

with 

Endocrinology as outpatient.  So if the workup ends up negative __________ we will

also talk with her PCP.  Continue on potassium supplements for now. 







Job ID:  [id number]

## 2020-06-13 NOTE — DIS
DATE OF ADMISSION:  06/12/2020



DATE OF DISCHARGE:  06/13/2020



DISCHARGE DIAGNOSES:  As of the following, hypokalemia.



HOSPITAL COURSE:  The patient is a 49-year-old female, who has been to the hospital

multiple times for hypokalemia.  She stated that she was supposed to follow up with

her endocrinologist, which is going to be next week.  She had some blood work done

and was told to come into the ER for low potassium levels.  She has been taking her

potassium pills as prescribed and she has been trying to eat foods with potassium.

She denies any symptoms.  She was given potassium replacement supplements, which she

did well.  Her discharge potassium was 3.4.  She was given an additional 40 and

another 10 IV of potassium. 



HOME MEDICATIONS:  

1. Potassium chloride liquid 40 mEq q.i.d.

2. Lipitor 10 daily. 

3. Pantoprazole 40 mg daily. 

4. Zoloft 50 mg daily.

5. Bupropion 1 tab p.o. b.i.d.



PHYSICAL EXAMINATION:

VITAL SIGNS:  Temperature 98.2, 70, 20, 98% on room air, 103/56. 

GENERAL:  She is awake, alert, and oriented x3, is in no pain distress. 

CV:  S1, S2 present.  No murmurs, rubs, or gallops.   



All the questions were answered.  Her initial potassium was 2.5.  Repeat after

replacement was 3.4.  She has had multiple testing.  She also had a urine potassium,

which was less than 10 two times.  She is going to follow up with Endocrinology. 







Job ID:  [id number]

## 2020-07-31 ENCOUNTER — HOSPITAL ENCOUNTER (INPATIENT)
Dept: HOSPITAL 92 - ERS | Age: 50
LOS: 2 days | Discharge: HOME | DRG: 641 | End: 2020-08-02
Attending: INTERNAL MEDICINE | Admitting: INTERNAL MEDICINE
Payer: COMMERCIAL

## 2020-07-31 VITALS — BODY MASS INDEX: 24.5 KG/M2

## 2020-07-31 DIAGNOSIS — E87.3: ICD-10-CM

## 2020-07-31 DIAGNOSIS — Z90.710: ICD-10-CM

## 2020-07-31 DIAGNOSIS — F17.210: ICD-10-CM

## 2020-07-31 DIAGNOSIS — F32.9: ICD-10-CM

## 2020-07-31 DIAGNOSIS — Z90.49: ICD-10-CM

## 2020-07-31 DIAGNOSIS — E86.0: ICD-10-CM

## 2020-07-31 DIAGNOSIS — Z88.8: ICD-10-CM

## 2020-07-31 DIAGNOSIS — E87.4: ICD-10-CM

## 2020-07-31 DIAGNOSIS — K21.9: ICD-10-CM

## 2020-07-31 DIAGNOSIS — E87.6: Primary | ICD-10-CM

## 2020-07-31 DIAGNOSIS — N18.1: ICD-10-CM

## 2020-07-31 DIAGNOSIS — Z88.5: ICD-10-CM

## 2020-07-31 DIAGNOSIS — Z20.828: ICD-10-CM

## 2020-07-31 DIAGNOSIS — I12.9: ICD-10-CM

## 2020-07-31 DIAGNOSIS — Z79.899: ICD-10-CM

## 2020-07-31 DIAGNOSIS — Z86.010: ICD-10-CM

## 2020-07-31 DIAGNOSIS — F41.9: ICD-10-CM

## 2020-07-31 LAB
ALBUMIN SERPL BCG-MCNC: 4.3 G/DL (ref 3.5–5)
ALP SERPL-CCNC: 86 U/L (ref 40–110)
ALT SERPL W P-5'-P-CCNC: 21 U/L (ref 8–55)
ANION GAP SERPL CALC-SCNC: 10 MMOL/L (ref 10–20)
AST SERPL-CCNC: 21 U/L (ref 5–34)
BASOPHILS # BLD AUTO: 0.1 THOU/UL (ref 0–0.2)
BASOPHILS NFR BLD AUTO: 0.6 % (ref 0–1)
BILIRUB SERPL-MCNC: 0.3 MG/DL (ref 0.2–1.2)
BUN SERPL-MCNC: 10 MG/DL (ref 7–18.7)
CALCIUM SERPL-MCNC: 9.5 MG/DL (ref 7.8–10.44)
CHLORIDE SERPL-SCNC: 99 MMOL/L (ref 98–107)
CO2 SERPL-SCNC: 32 MMOL/L (ref 22–29)
CREAT CL PREDICTED SERPL C-G-VRATE: 0 ML/MIN (ref 70–130)
EOSINOPHIL # BLD AUTO: 0.2 THOU/UL (ref 0–0.7)
EOSINOPHIL NFR BLD AUTO: 2.9 % (ref 0–10)
GLOBULIN SER CALC-MCNC: 2.9 G/DL (ref 2.4–3.5)
GLUCOSE SERPL-MCNC: 90 MG/DL (ref 70–105)
HGB BLD-MCNC: 12.9 G/DL (ref 12–16)
LYMPHOCYTES # BLD: 1.9 THOU/UL (ref 1.2–3.4)
LYMPHOCYTES NFR BLD AUTO: 22.8 % (ref 21–51)
MCH RBC QN AUTO: 31.6 PG (ref 27–31)
MCV RBC AUTO: 93.6 FL (ref 78–98)
MONOCYTES # BLD AUTO: 0.7 THOU/UL (ref 0.11–0.59)
MONOCYTES NFR BLD AUTO: 8.5 % (ref 0–10)
NEUTROPHILS # BLD AUTO: 5.3 THOU/UL (ref 1.4–6.5)
NEUTROPHILS NFR BLD AUTO: 65.1 % (ref 42–75)
PLATELET # BLD AUTO: 367 THOU/UL (ref 130–400)
POTASSIUM SERPL-SCNC: 2.1 MMOL/L (ref 3.5–5.1)
POTASSIUM UR-SCNC: (no result) MMOL/L
RBC # BLD AUTO: 4.09 MILL/UL (ref 4.2–5.4)
SODIUM SERPL-SCNC: 139 MMOL/L (ref 136–145)
WBC # BLD AUTO: 8.1 THOU/UL (ref 4.8–10.8)

## 2020-07-31 PROCEDURE — U0003 INFECTIOUS AGENT DETECTION BY NUCLEIC ACID (DNA OR RNA); SEVERE ACUTE RESPIRATORY SYNDROME CORONAVIRUS 2 (SARS-COV-2) (CORONAVIRUS DISEASE [COVID-19]), AMPLIFIED PROBE TECHNIQUE, MAKING USE OF HIGH THROUGHPUT TECHNOLOGIES AS DESCRIBED BY CMS-2020-01-R: HCPCS

## 2020-07-31 PROCEDURE — 84133 ASSAY OF URINE POTASSIUM: CPT

## 2020-07-31 PROCEDURE — 96366 THER/PROPH/DIAG IV INF ADDON: CPT

## 2020-07-31 PROCEDURE — 96365 THER/PROPH/DIAG IV INF INIT: CPT

## 2020-07-31 PROCEDURE — 36415 COLL VENOUS BLD VENIPUNCTURE: CPT

## 2020-07-31 PROCEDURE — 80048 BASIC METABOLIC PNL TOTAL CA: CPT

## 2020-07-31 PROCEDURE — 85025 COMPLETE CBC W/AUTO DIFF WBC: CPT

## 2020-07-31 PROCEDURE — 93005 ELECTROCARDIOGRAM TRACING: CPT

## 2020-07-31 PROCEDURE — 87635 SARS-COV-2 COVID-19 AMP PRB: CPT

## 2020-07-31 PROCEDURE — 84100 ASSAY OF PHOSPHORUS: CPT

## 2020-07-31 PROCEDURE — 82570 ASSAY OF URINE CREATININE: CPT

## 2020-07-31 PROCEDURE — 83735 ASSAY OF MAGNESIUM: CPT

## 2020-07-31 RX ADMIN — BUPROPION HYDROCHLORIDE SCH MG: 150 TABLET, FILM COATED, EXTENDED RELEASE ORAL at 22:06

## 2020-07-31 NOTE — PDOC.HHP
Hospitalist HPI





- History of Present Illness


Abn Labs


History of Present Illness: 





Patient is 50-year-old female with chronic hypokalemia presented to the 

hospital with abnormal labs.  She was found to have potassium of 2.2 as 

outpatient.  She has a history of chronic hypokalemia and is currently on 40 

mEq of potassium chloride 4 times a day.  She has had extensive work-up for 

this in the past by nephrologist.  Patient denies any nausea vomiting diarrhea 

or loss of appetite.  No fever chills sick contacts reported.  Patient denies 

any neurologic symptoms including lightheadedness dizziness or syncope.  

Patient is compliant with all of her medications.


In the emergency room she was found to have potassium of 2.1.  She was started 

on potassium supplementation.


ED Course: 





VITAL SIGNS Fri Jul 31, 2020 09:14 DYLAN Richards Mckenna 


BP: 103/74, Pulse: 74, Resp: 16, Temp: 97.9 (Oral), Pain: 0, O2 sat: 98 on (

Room Air), Time: 7/31/2020 09:14. 


VITAL SIGNS Fri Jul 31, 2020 10:00 DYLAN Jacobs Macie 


BP: 104/65, MAP: 78, Pulse: 65, Resp: 15, Pain: 0, O2 sat: 97 on (Room Air), 

Time: 7/31/2020 10:00. 


VITAL SIGNS Fri Jul 31, 2020 11:00 DLYAN Jacobs Macie 


BP: 104/65, Pulse: 66, Resp: 17, Pain: 0, O2 sat: 99 on (Room Air), Time: 7/31/ 2020 11:00. 


VITAL SIGNS Fri Jul 31, 2020 12:00 DYLAN Jacobs Macie 


BP: 107/70, Pulse: 69, Resp: 19, Pain: 0, O2 sat: 98 on (Room Air), Time: 7/31/ 2020 12:00. 


VITAL SIGNS Fri Jul 31, 2020 13:00 DYLAN Jacobs Macie 


BP: 111/69, Pulse: 60, Resp: 14, Pain: 0, O2 sat: 100 on (Room Air), Time: 7/31/ 2020 13:00. 


VITAL SIGNS Fri Jul 31, 2020 13:38 DYLAN Jacobs Macie 


Temp: 98.1 (Oral), Time: 7/31/2020 13:38. 


VITAL SIGNS Fri Jul 31, 2020 14:00 DYLAN Jacobs Macie 


BP: 108/66, Pulse: 67, Resp: 13, Pain: 0, O2 sat: 98 on (Room Air), Time: 7/31/ 2020 14:00. 


VITAL SIGNS Fri Jul 31, 2020 15:00 DYLAN Jacobs Macie 


BP: 110/70, Pulse: 84, Resp: 19, Pain: 0, O2 sat: 95 on (Room Air), Time: 7/31/ 2020 15:00.





MEDICATION ADMINISTRATION SUMMARY 


Fri Jul 31, 2020 17:02 











 Drug Name  Dose Ordered Route Status Time


 


*potassium chloride intravenous  40 mEq IV Piggy Back Given 11:30 7/31/2020


 


potassium chloride oral  40 mEq Oral Given 11:30 7/31/2020

















Hospitalist ROS





- Review of Systems


Respiratory: denies: cough, dry, shortness of breath, hemoptysis, SOB with 

excertion, pleuritic pain, sputum, wheezing, other


Cardiovascular: denies: chest pain, palpitations, orthopnea, paroxysmal noc. 

dyspnea, edema, light headedness, other


Gastrointestinal: denies: nausea, vomiting, abdominal pain, diarrhea, 

constipation, melena, hematochezia, other


Genitourinary: denies: dysuria, frequency, incontinence, hematuria, retention, 

other


All other systems reviewed; all pertinent +/- noted in HPI/Subj





- Medication


Medications: 





HOME MEDICATIONS: 


1. Potassium chloride liquid 40 mEq q.i.d. 


2. Lipitor 10 daily. 


3. Pantoprazole 40 mg daily. 


4. Zoloft 50 mg daily. 


5. Bupropion 1 tab p.o. b.i.d.





Hospitalist History





- Past Medical History


Other Medical History: 





PAST MEDICAL HISTORY: Chronic hypokalemia with recent hospitalization, GERD, 

Anxiety, family history of colon cancer, history of colon polyps





PAST SURGICAL HISTORY: EGD 2019, colonoscopy 2019, cholecystectomy, 

hysterectomy. h/o AV fistula placed with subsequent removal.  History of 

partial colectomy for colon mass in 1997 per patient report.





ALLERGIES: TO MORPHINE, LASIX, AND ADHESIVES. 





SOCIAL HISTORY: She is . She is a full code. She occasionally takes 

alcohol. She smokes about five cigarettes a day. 





FAMILY HISTORY: Significant for diabetes and hypertension, and she said her 

mother had colon cancer. 








- Exam


General Appearance: NAD


Eye: PERRL, anicteric sclera


ENT: normocephalic atraumatic, no oropharyngeal lesions


Neck: supple, symmetric, no JVD


Heart: RRR, no gallops, no rubs, normal peripheral pulses


Respiratory: CTAB, no rales, no ronchi, normal chest expansion


Gastrointestinal: soft, non-tender, non-distended, normal bowel sounds, no 

guarding, no rigidity


Extremities: no cyanosis, no clubbing, no edema


Neurological: cranial nerve grossly intact, normal sensation to touch, no focal 

deficits


Musculoskeletal: normal tone, normal strength


Psychiatric: normal affect, A&O x 3





Hospitalist Results





- Labs


Result Diagrams: 


 07/31/20 09:37





 08/01/20 13:53


Lab results: 


 











WBC  8.1 thou/uL (4.8-10.8)   07/31/20  09:37    


 


Hgb  12.9 g/dL (12.0-16.0)   07/31/20  09:37    


 


Hct  38.3 % (36.0-47.0)   07/31/20  09:37    


 


MCV  93.6 fL (78.0-98.0)   07/31/20  09:37    


 


Plt Count  367 thou/uL (130-400)   07/31/20  09:37    


 


Neutrophils %  65.1 % (42.0-75.0)   07/31/20  09:37    


 


Sodium  139 mmol/L (136-145)   07/31/20  09:37    


 


Potassium  2.1 mmol/L (3.5-5.1)  L*  07/31/20  09:37    


 


Chloride  99 mmol/L ()   07/31/20  09:37    


 


Carbon Dioxide  32 mmol/L (22-29)  H  07/31/20  09:37    


 


BUN  10 mg/dL (7.0-18.7)   07/31/20  09:37    


 


Creatinine  0.74 mg/dL (0.6-1.1)   07/31/20  09:37    


 


Glucose  90 mg/dL ()   07/31/20  09:37    


 


Calcium  9.5 mg/dL (7.8-10.44)   07/31/20  09:37    


 


Total Bilirubin  0.3 mg/dL (0.2-1.2)   07/31/20  09:37    


 


AST  21 U/L (5-34)   07/31/20  09:37    


 


ALT  21 U/L (8-55)   07/31/20  09:37    


 


Alkaline Phosphatase  86 U/L ()   07/31/20  09:37    


 


Serum Total Protein  7.2 g/dL (6.0-8.3)   07/31/20  09:37    


 


Albumin  4.3 g/dL (3.5-5.0)   07/31/20  09:37    














- EKG Interpretation


EKG: 





SR - reviewed by me





Hospitalist H&P A/P





- Plan


Plan: 





Acute on chronic hypokalemia


GERD


Anxiety


Dehydration with metabolic alkalosis


Tobacco dependence


Family history of colon cancer


History of colonic polyp requiring partial colon resection





Plan: Patient will be monitored on the telemetry unit.  She is currently 

receiving 40 M EQ of potassium chloride through intravenously.  She also 

received 40 M EQ in the emergency room.  Will continue her home replacement at 

40 meq use 4 times a day.  We will also aggressively replace potassium 

intravenously.  Will check magnesium and phosphorus.  Will check urine 

creatinine and urine potassium.  Urine potassium was less than 10 last 

admission.  Renin and aldosterone was recently checked.  The cause of 

hypokalemia is probably GI in origineither malabsorption or increased GI loss.

  Recheck potassium in a.m. Tobacco cessation was emphasized. Restart home 

medications once verified


Patient will require 2 to 3 days for stabilization.

## 2020-07-31 NOTE — CON
DATE OF CONSULTATION:  



REASON FOR CONSULTATION:  Hypokalemia.



HISTORY OF PRESENT ILLNESS:  This is a 50-year-old female, who presents to the

hospital with a potassium of 2.1.  The patient is taking 40 mEq of potassium t.i.d.

The patient denies any nausea, vomiting, or diarrhea. 



PAST MEDICAL HISTORY:  Hypertension, hypokalemia, and hysterectomy.



MEDICATIONS:  Home medications:  Reviewed. 



Hospital medications:  Reviewed.



ALLERGIES:  REVIEWED.



REVIEW OF SYSTEMS:  A 15-point review of systems was performed, negative except for

positives noted above. 

HEENT:  Eyes intact, no diplopia.  Ears:  No hearing loss or earache.  Nose:  No

discharge or bleeding. 

Chest:  No cough or phlegm. 

Abdomen:  No nausea or vomiting. 

Genitourinary:  No hematuria.  No Cancino catheter. 

Musculoskeletal:  No low back pain.  No joint swelling or pain. 

Neurological:  No syncope.  No seizures. 

Skin:  No complaints of rash or itching. 

Psychiatric:  No depression. 

Constitutional:  No weight loss or loss of appetite.



PHYSICAL EXAMINATION:

GENERAL:  The patient is awake and alert. 

VITAL SIGNS:  Afebrile, pulse 100, breathing at 16, and blood pressure 130/70. 

HEENT:  Head, normocephalic and atraumatic.  Eyes, intact, no ulcers.  Nose, intact,

no ulcers.  Ears, intact, no ulcers. 

Neck:  Supple.  No JVD. 

Chest:  Symmetrical and clear. 

Cardiovascular:  Shows S1 and S2, no rub, no murmur. 

Gastrointestinal:  Abdomen is soft, bowel sounds positive. 

Extremities:  Show no edema or ulcers. 

Skin:  Shows no rash or petechiae. 

Musculoskeletal:  Shows no joint swelling or stiffness. 

Genitourinary:  Shows no Cancino or CVA tenderness. 

Neurologic:  Motor intact.  Cranial nerves intact.



ASSESSMENT AND RECOMMENDATIONS:  

1. Hypokalemia.  We would recommend aggressive potassium replacement.

2. Metabolic alkalosis.

3. 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  

We will follow closely.







Job ID:  [id number]

## 2020-08-01 LAB
ANION GAP SERPL CALC-SCNC: 11 MMOL/L (ref 10–20)
ANION GAP SERPL CALC-SCNC: 12 MMOL/L (ref 10–20)
BUN SERPL-MCNC: 7 MG/DL (ref 7–18.7)
BUN SERPL-MCNC: 8 MG/DL (ref 7–18.7)
CALCIUM SERPL-MCNC: 8.6 MG/DL (ref 7.8–10.44)
CALCIUM SERPL-MCNC: 9 MG/DL (ref 7.8–10.44)
CHLORIDE SERPL-SCNC: 103 MMOL/L (ref 98–107)
CHLORIDE SERPL-SCNC: 104 MMOL/L (ref 98–107)
CO2 SERPL-SCNC: 28 MMOL/L (ref 22–29)
CO2 SERPL-SCNC: 29 MMOL/L (ref 22–29)
CREAT CL PREDICTED SERPL C-G-VRATE: 110 ML/MIN (ref 70–130)
CREAT CL PREDICTED SERPL C-G-VRATE: 121 ML/MIN (ref 70–130)
GLUCOSE SERPL-MCNC: 87 MG/DL (ref 70–105)
GLUCOSE SERPL-MCNC: 90 MG/DL (ref 70–105)
POTASSIUM SERPL-SCNC: 3 MMOL/L (ref 3.5–5.1)
POTASSIUM SERPL-SCNC: 3.7 MMOL/L (ref 3.5–5.1)
SODIUM SERPL-SCNC: 139 MMOL/L (ref 136–145)
SODIUM SERPL-SCNC: 141 MMOL/L (ref 136–145)

## 2020-08-01 RX ADMIN — POTASSIUM CHLORIDE SCH MLS: 2 INJECTION, SOLUTION, CONCENTRATE INTRAVENOUS at 10:44

## 2020-08-01 RX ADMIN — BUPROPION HYDROCHLORIDE SCH MG: 150 TABLET, FILM COATED, EXTENDED RELEASE ORAL at 08:40

## 2020-08-01 RX ADMIN — POTASSIUM CHLORIDE SCH MLS: 2 INJECTION, SOLUTION, CONCENTRATE INTRAVENOUS at 15:54

## 2020-08-01 RX ADMIN — BUPROPION HYDROCHLORIDE SCH MG: 150 TABLET, FILM COATED, EXTENDED RELEASE ORAL at 20:28

## 2020-08-01 NOTE — PRG
DATE OF SERVICE:  08/01/2020



SUBJECTIVE:  A 50-year-old female being seen for hypokalemia.  The patient denies

any nausea, vomiting, or chest pain. 



OBJECTIVE:  GENERAL:  The patient is awake and alert. 

VITAL SIGNS:  Afebrile.  Pulse 75, breathing 16, blood pressure 107/64. 

HEENT:  Head normocephalic and atraumatic.  Eyes intact, no ulcers.  Nose intact, no

ulcers.  Ears intact, no ulcers. 

NECK:  Supple.  No JVD. 

CHEST:  Symmetrical and clear. 

CARDIOVASCULAR:  Shows S1 and S2, no rub, no murmur. 

GASTROINTESTINAL:  Abdomen is soft, bowel sounds positive. 

EXTREMITIES:  Show no edema or ulcers. 

SKIN:  Shows no rash or petechiae. 

MUSCULOSKELETAL:  Shows no joint swelling or stiffness. 

GENITOURINARY:  Shows no Cancino or CVA tenderness. 

NEUROLOGIC:  Motor intact.  Cranial nerves intact.



LABORATORY DATA:  Reviewed.



ASSESSMENT AND PLAN:  

1. Stage I chronic kidney disease, stable.

2. Hypertension, stable.

3. Hypokalemia, improved.  I recommend aggressive potassium replacement.







Job ID:  [id number]

## 2020-08-01 NOTE — PDOC.HOSPP
- Subjective


Encounter Date: 08/01/20


Encounter Time: 10:00


Subjective: 





Patient seen and examined for hypokalemia.  Denies any nausea vomiting or 

diarrhea.  Symptomatically she feels slightly better.





- Objective


Vital Signs & Weight: 


 Vital Signs (12 hours)











  Temp Pulse Resp BP BP Pulse Ox


 


 08/01/20 11:36  97.9 F  88  16  97/55 L   98


 


 08/01/20 07:37  97.8 F  76  18   107/64  98


 


 08/01/20 05:52       98


 


 08/01/20 05:17  97.6 F  76  17  96/52 L   98








 Weight











Weight                         176 lb 5.917 oz














I&O: 


 











 07/31/20 08/01/20 08/02/20





 06:59 06:59 06:59


 


Intake Total  1868 


 


Balance  1868 











Result Diagrams: 


 07/31/20 09:37





 08/01/20 13:53


Additional Labs: 





 Laboratory Tests











  07/31/20 07/31/20 08/01/20





  09:37 19:19 04:21


 


Potassium  2.1 L*   3.0 L


 


Urine Potassium   Less than  10.0 














Hospitalist ROS





- Review of Systems


Respiratory: denies: cough, dry, shortness of breath, hemoptysis, SOB with 

excertion, pleuritic pain, sputum, wheezing, other


Cardiovascular: denies: chest pain, palpitations, orthopnea, paroxysmal noc. 

dyspnea, edema, light headedness, other


Gastrointestinal: denies: nausea, vomiting, abdominal pain, diarrhea, 

constipation, melena, hematochezia, other





- Medication


Medications: 


Active Medications











Generic Name Dose Route Start Last Admin





  Trade Name Freq  PRN Reason Stop Dose Admin


 


Atorvastatin Calcium  10 mg  08/01/20 09:00  08/01/20 08:40





  Lipitor  PO   10 mg





  DAILY REFUGIO   Administration





     





     





     





     


 


Bupropion HCl  150 mg  07/31/20 21:00  08/01/20 08:40





  Wellbutrin Sr  PO   150 mg





  BID REFUGIO   Administration





     





     





     





     


 


Potassium Chloride 40 meq/  270 mls @ 67.5 mls/hr  08/01/20 09:30  08/01/20 10:

44





  Sodium Chloride  IVPB  08/01/20 19:29  270 mls





  Q6H REFUGIO   Administration





     





     





     





     


 


Phosphorus  500 mg  07/31/20 12:00  08/01/20 12:06





  Kphos Neutral  PO   500 mg





  TID-WM REFUGIO   Administration





     





     





     





     


 


Potassium Chloride  40 meq  07/31/20 17:00  08/01/20 12:06





  Klor-Con  PO   40 meq





  QID-WM REFUGIO   Administration





     





     





     





     


 


Sertraline HCl  50 mg  08/01/20 09:00  08/01/20 08:40





  Zoloft  PO   50 mg





  DAILY REFUGIO   Administration





     





     





     





     














- Exam


General Appearance: NAD


Neck: supple, no JVD


Heart: RRR, no gallops


Respiratory: no wheezes, no ronchi


Gastrointestinal: non-tender, normal bowel sounds (Bell)


Extremities: no cyanosis





Hosp A/P





- Plan


DVT proph w/SCDs





Acute on chronic hypokalemia


GERD


Anxiety


Dehydration with metabolic alkalosis


Tobacco dependence


Family history of colon cancer


History of colonic polyp requiring partial colon resection





Plan: 


Potassium today was 3.0.  Will continue oral supplementation with 40 mEq 4 

times a day.  We will also add intravenous potassium supplementation with 40 M 

EQ for 3 doses today.  Will recheck potassium in a.m.  Continue other 

medications as above.  Protonix on hold at this time.  Urine potassium was less 

than 10.  Discharge probably in a.m. if stable

## 2020-08-02 VITALS — SYSTOLIC BLOOD PRESSURE: 110 MMHG | DIASTOLIC BLOOD PRESSURE: 55 MMHG | TEMPERATURE: 98 F

## 2020-08-02 LAB
ANION GAP SERPL CALC-SCNC: 11 MMOL/L (ref 10–20)
BUN SERPL-MCNC: 6 MG/DL (ref 7–18.7)
CALCIUM SERPL-MCNC: 8.4 MG/DL (ref 7.8–10.44)
CHLORIDE SERPL-SCNC: 111 MMOL/L (ref 98–107)
CO2 SERPL-SCNC: 22 MMOL/L (ref 22–29)
CREAT CL PREDICTED SERPL C-G-VRATE: 131 ML/MIN (ref 70–130)
GLUCOSE SERPL-MCNC: 84 MG/DL (ref 70–105)
POTASSIUM SERPL-SCNC: 4 MMOL/L (ref 3.5–5.1)
SODIUM SERPL-SCNC: 140 MMOL/L (ref 136–145)

## 2020-08-02 RX ADMIN — BUPROPION HYDROCHLORIDE SCH MG: 150 TABLET, FILM COATED, EXTENDED RELEASE ORAL at 08:47

## 2020-08-02 NOTE — PRG
DATE OF SERVICE:  08/02/2020



SUBJECTIVE:  A 50-year-old female, being seen for hypokalemia.  The patient denied

nausea, vomiting, or chest pain. 



OBJECTIVE:  General:  The patient is awake and alert. 

Vital Signs:  Afebrile, pulse 75, breathing 16, blood pressure 120/53. 

HEENT:  Head normocephalic and atraumatic.  Eyes intact, no ulcers.  Nose intact, no

ulcers.  Ears intact, no ulcers. 

Neck:  Supple.  No JVD. 

Chest:  Symmetrical and clear. 

Cardiovascular:  Shows S1 and S2, no rub, no murmur. 

Gastrointestinal:  Abdomen is soft, bowel sounds positive. 

Extremities:  Show no edema or ulcers. 

Skin:  Shows no rash or petechiae. 

Musculoskeletal:  Shows no joint swelling or stiffness. 

Genitourinary:  Shows no Cancino or CVA tenderness. 

Neurologic:  Motor intact.  Cranial nerves intact.



LABORATORY DATA:  Reviewed.



ASSESSMENT AND PLAN:  

1. Chronic kidney disease, stage 1, stable.

2. Hypokalemia, resolved.

3. Hypertension, stable.

4. The patient will follow up with Dr. Cameron.







Job ID:  [id number]

## 2020-08-02 NOTE — DIS
DATE OF ADMISSION:  07/31/2020



DATE OF DISCHARGE:  08/02/2020



DISCHARGE DISPOSITION:  Home.



FOLLOWUP:  

1. Follow up with Dr. Mcguire in 1 week.

2. Follow up with Dr. Cameron in 1 week.  A repeat basic metabolic profile is

recommended.  Primary care physician advised to follow. 



BRIEF HOSPITAL COURSE:  The patient is a 50-year-old female with chronic

hypokalemia, presented to the hospital with a potassium of 2.2 as outpatient.  She

is on potassium chloride 40 mEq four times a day.  She has had extensive workup for

this in the past.  Please refer to the history and physical for further details. 



The patient was admitted to the hospital with a diagnosis of acute on chronic

hypokalemia.  She was started on IV potassium chloride with good improvement.  Her

potassium today was 4.0.  Her oral potassium was also continued.  She was advised to

follow up with Nephrology as outpatient.  She denies any concerns on the day of

discharge. 



FINAL DIAGNOSES:  

1. Acute on chronic hypokalemia.

2. Gastroesophageal reflux disease.

3. Anxiety.

4. Dehydration with metabolic acidosis.

5. Tobacco dependence.

6. Family history of colon cancer.

7. History of colonic polyp, requiring partial colon resection.



SIGNIFICANT LABORATORY DATA:  Urine potassium was less than 10. 



COVID testing was negative. 



Potassium on admission was 2.1, yesterday morning was 3.0, this morning was 4.0. 



WBC 8.1 with hemoglobin 12.9. 



The patient understands the above plan of care.







Job ID:  [id number]

## 2020-08-20 ENCOUNTER — HOSPITAL ENCOUNTER (OUTPATIENT)
Dept: HOSPITAL 57 - BURERS | Age: 50
Setting detail: OBSERVATION
LOS: 1 days | Discharge: HOME | End: 2020-08-21
Attending: FAMILY MEDICINE | Admitting: FAMILY MEDICINE
Payer: COMMERCIAL

## 2020-08-20 VITALS — BODY MASS INDEX: 2.2 KG/M2

## 2020-08-20 DIAGNOSIS — E78.5: ICD-10-CM

## 2020-08-20 DIAGNOSIS — F17.210: ICD-10-CM

## 2020-08-20 DIAGNOSIS — Z91.048: ICD-10-CM

## 2020-08-20 DIAGNOSIS — Z91.040: ICD-10-CM

## 2020-08-20 DIAGNOSIS — F32.9: ICD-10-CM

## 2020-08-20 DIAGNOSIS — F41.8: ICD-10-CM

## 2020-08-20 DIAGNOSIS — Z79.899: ICD-10-CM

## 2020-08-20 DIAGNOSIS — K21.9: ICD-10-CM

## 2020-08-20 DIAGNOSIS — Z88.5: ICD-10-CM

## 2020-08-20 DIAGNOSIS — Z80.0: ICD-10-CM

## 2020-08-20 DIAGNOSIS — E87.6: Primary | ICD-10-CM

## 2020-08-20 DIAGNOSIS — Z20.828: ICD-10-CM

## 2020-08-20 LAB
ALBUMIN SERPL BCG-MCNC: 3.7 G/DL (ref 3.5–5)
ALBUMIN SERPL BCG-MCNC: 4.1 G/DL (ref 3.5–5)
ALP SERPL-CCNC: 69 U/L (ref 40–110)
ALP SERPL-CCNC: 79 U/L (ref 40–110)
ALT SERPL W P-5'-P-CCNC: 14 U/L (ref 8–55)
ALT SERPL W P-5'-P-CCNC: 19 U/L (ref 8–55)
ANION GAP SERPL CALC-SCNC: 13 MMOL/L (ref 10–20)
ANION GAP SERPL CALC-SCNC: 15 MMOL/L (ref 10–20)
AST SERPL-CCNC: 16 U/L (ref 5–34)
AST SERPL-CCNC: 18 U/L (ref 5–34)
BASOPHILS # BLD AUTO: 0.1 THOU/UL (ref 0–0.2)
BASOPHILS NFR BLD AUTO: 0.9 % (ref 0–1)
BILIRUB SERPL-MCNC: 0.3 MG/DL (ref 0.2–1.2)
BILIRUB SERPL-MCNC: 0.3 MG/DL (ref 0.2–1.2)
BUN SERPL-MCNC: 12 MG/DL (ref 7–18.7)
BUN SERPL-MCNC: 12 MG/DL (ref 7–18.7)
CALCIUM SERPL-MCNC: 8.4 MG/DL (ref 7.8–10.44)
CALCIUM SERPL-MCNC: 9.5 MG/DL (ref 7.8–10.44)
CHLORIDE SERPL-SCNC: 103 MMOL/L (ref 98–107)
CHLORIDE SERPL-SCNC: 99 MMOL/L (ref 98–107)
CO2 SERPL-SCNC: 26 MMOL/L (ref 22–29)
CO2 SERPL-SCNC: 27 MMOL/L (ref 22–29)
CREAT CL PREDICTED SERPL C-G-VRATE: 0 ML/MIN (ref 70–130)
CREAT CL PREDICTED SERPL C-G-VRATE: 10 ML/MIN (ref 70–130)
EOSINOPHIL # BLD AUTO: 0.3 THOU/UL (ref 0–0.7)
EOSINOPHIL NFR BLD AUTO: 3.5 % (ref 0–10)
GLOBULIN SER CALC-MCNC: 2.4 G/DL (ref 2.4–3.5)
GLOBULIN SER CALC-MCNC: 2.7 G/DL (ref 2.4–3.5)
GLUCOSE SERPL-MCNC: 106 MG/DL (ref 70–105)
GLUCOSE SERPL-MCNC: 94 MG/DL (ref 70–105)
HGB BLD-MCNC: 12.4 G/DL (ref 12–16)
LYMPHOCYTES # BLD AUTO: 2 THOU/UL (ref 1.2–3.4)
LYMPHOCYTES NFR BLD AUTO: 24.3 % (ref 21–51)
MAGNESIUM SERPL-MCNC: 2.1 MG/DL (ref 1.6–2.6)
MCH RBC QN AUTO: 32.2 PG (ref 27–31)
MCV RBC AUTO: 98.7 FL (ref 78–98)
MONOCYTES # BLD AUTO: 0.7 THOU/UL (ref 0.11–0.59)
MONOCYTES NFR BLD AUTO: 8.7 % (ref 0–10)
NEUTROPHILS # BLD AUTO: 5.2 THOU/UL (ref 1.4–6.5)
NEUTROPHILS NFR BLD AUTO: 62.6 % (ref 42–75)
PLATELET # BLD AUTO: 210 THOU/UL (ref 130–400)
POTASSIUM SERPL-SCNC: 2.7 MMOL/L (ref 3.5–5.1)
POTASSIUM SERPL-SCNC: 3.2 MMOL/L (ref 3.5–5.1)
RBC # BLD AUTO: 3.85 MILL/UL (ref 4.2–5.4)
SODIUM SERPL-SCNC: 138 MMOL/L (ref 136–145)
SODIUM SERPL-SCNC: 139 MMOL/L (ref 136–145)
WBC # BLD AUTO: 8.4 THOU/UL (ref 4.8–10.8)

## 2020-08-20 PROCEDURE — G0378 HOSPITAL OBSERVATION PER HR: HCPCS

## 2020-08-20 PROCEDURE — U0003 INFECTIOUS AGENT DETECTION BY NUCLEIC ACID (DNA OR RNA); SEVERE ACUTE RESPIRATORY SYNDROME CORONAVIRUS 2 (SARS-COV-2) (CORONAVIRUS DISEASE [COVID-19]), AMPLIFIED PROBE TECHNIQUE, MAKING USE OF HIGH THROUGHPUT TECHNOLOGIES AS DESCRIBED BY CMS-2020-01-R: HCPCS

## 2020-08-20 PROCEDURE — 80053 COMPREHEN METABOLIC PANEL: CPT

## 2020-08-20 PROCEDURE — 80048 BASIC METABOLIC PNL TOTAL CA: CPT

## 2020-08-20 PROCEDURE — 86769 SARS-COV-2 COVID-19 ANTIBODY: CPT

## 2020-08-20 PROCEDURE — 93005 ELECTROCARDIOGRAM TRACING: CPT

## 2020-08-20 PROCEDURE — 96366 THER/PROPH/DIAG IV INF ADDON: CPT

## 2020-08-20 PROCEDURE — 85025 COMPLETE CBC W/AUTO DIFF WBC: CPT

## 2020-08-20 PROCEDURE — 83735 ASSAY OF MAGNESIUM: CPT

## 2020-08-20 PROCEDURE — 36415 COLL VENOUS BLD VENIPUNCTURE: CPT

## 2020-08-20 PROCEDURE — 96365 THER/PROPH/DIAG IV INF INIT: CPT

## 2020-08-20 PROCEDURE — 87635 SARS-COV-2 COVID-19 AMP PRB: CPT

## 2020-08-20 RX ADMIN — BUPROPION HYDROCHLORIDE SCH MG: 150 TABLET, EXTENDED RELEASE ORAL at 20:52

## 2020-08-21 VITALS — SYSTOLIC BLOOD PRESSURE: 111 MMHG | TEMPERATURE: 98.6 F | DIASTOLIC BLOOD PRESSURE: 61 MMHG

## 2020-08-21 LAB
ANION GAP SERPL CALC-SCNC: 14 MMOL/L (ref 10–20)
ANION GAP SERPL CALC-SCNC: 16 MMOL/L (ref 10–20)
BUN SERPL-MCNC: 11 MG/DL (ref 7–18.7)
BUN SERPL-MCNC: 8 MG/DL (ref 7–18.7)
CALCIUM SERPL-MCNC: 8.2 MG/DL (ref 7.8–10.44)
CALCIUM SERPL-MCNC: 8.5 MG/DL (ref 7.8–10.44)
CHLORIDE SERPL-SCNC: 105 MMOL/L (ref 98–107)
CHLORIDE SERPL-SCNC: 107 MMOL/L (ref 98–107)
CO2 SERPL-SCNC: 21 MMOL/L (ref 22–29)
CO2 SERPL-SCNC: 21 MMOL/L (ref 22–29)
CREAT CL PREDICTED SERPL C-G-VRATE: 132 ML/MIN (ref 70–130)
CREAT CL PREDICTED SERPL C-G-VRATE: 136 ML/MIN (ref 70–130)
GLUCOSE SERPL-MCNC: 83 MG/DL (ref 70–105)
GLUCOSE SERPL-MCNC: 85 MG/DL (ref 70–105)
POTASSIUM SERPL-SCNC: 2.8 MMOL/L (ref 3.5–5.1)
POTASSIUM SERPL-SCNC: 3.8 MMOL/L (ref 3.5–5.1)
SODIUM SERPL-SCNC: 138 MMOL/L (ref 136–145)
SODIUM SERPL-SCNC: 139 MMOL/L (ref 136–145)

## 2020-08-21 RX ADMIN — BUPROPION HYDROCHLORIDE SCH MG: 150 TABLET, EXTENDED RELEASE ORAL at 08:08

## 2020-08-21 NOTE — HP
HISTORY OF PRESENT ILLNESS:  A 50-year-old female patient admitted to [facility] in [place], [facility] of hypokalemia.  She was told through

routine followup blood work and to present to the emergency room because of

potassium of 2.7.  Hypokalemia is a chronic condition for this patient.  Workup is

in progress.  She has had admissions for hypokalemia, 4 admissions prior to this in

2020.  She is being followed by Renal, Dr. Heath, who suspects a non renal

etiology.  She had a colonoscopy on 11/20/2019 that was normal except for some

benign polyps.  Her PCP is Dr. Liu. She takes potassium chloride orally 40 mEq

four times a day. 



CURRENT MEDICATIONS:  

1. Potassium chloride 40 mEq q.i.d.

2. Lipitor 10 mg daily.

3. Pantoprazole 40 mg daily.

4. Zoloft 50 mg daily.

5. Bupropion b.i.d.



PAST MEDICAL HISTORY:  Chronic hypokalemia, GERD, and anxiety.



FAMILY HISTORY:  Positive for colon cancer.



SOCIAL HISTORY:  She currently smokes about 1 to 3 cigarettes daily, some days none.

 She is trying to quit smoking.  Alcohol, occasional social drinker. 



ALLERGIES:  INCLUDE TAPE, LATEX, AND MORPHINE.



REVIEW OF SYSTEMS:  CONSTITUTIONAL: Denies fever, chills, sweats, appetite change,

or weight gain.  She has however lost about 25 pounds over the last 6 to 8 months.

She does complain of fatigue. 

EYES: Denies vision changes, eye pain, discharge or redness. 

ENT: Denies hearing loss, ear pain, URI symptoms or sore throat. 

RESPIRATORY: Denies cough, dyspnea, wheeze, or asthma. 

CV: Denies chest pain, dyspnea on exertion, PND, orthopnea, edema, or claudication. 

GI: Denies constipation, diarrhea, indigestion, dysphagia, food allergies or

intolerances. 

: Denies frequency, dysuria, nocturia, or incontinence. 

MUSCULOSKELETAL:  She reports muscle weakness.  Denies joint pain or joint swelling. 

SKIN:  No rash or lesion. 

NEURO: Denies dizziness, syncope, headache, ataxia or confusion.  She does report

weakness. 

PSYCH:  Past medical history of anxiety.



PHYSICAL EXAMINATION:

GENERAL APPEARANCE: Alert, oriented x3, well appearing, no acute distress. 

EYES: Conjunctiva clear.  No discharge.  PERRLA. EOMs intact.  Oral cavity, moist

mucous membranes.  No ulcer or lesion. Normal dentition. 

NECK: Thyroid, supple.  No lymphadenopathy.  No JVD.  No carotid bruit.  No

thyromegaly. 

CV: Regular rate and rhythm.  Normal S1, S2.  No murmur. 

RESPIRATIONS: Clear to auscultation.  Good air entry bilaterally.  No wheeze or

crackles. 

ABDOMEN:  Soft, nontender.  No masses or megaly.  Normal bowel sounds. 

NEURO: CN 2 through 12 grossly intact.  Motor function normal. 

SKIN:  Normal. No rash. 

PSYCH: Appropriate mood and affect.  Normal speech.  No thought disorder.



LABORATORY DATA:  Admission Labs:  WBCs 8.4, H and H 12.4 and 38, platelets 210.

Sodium 138, potassium 2.7, chloride 99, CO2 of 27, BUN 12, creatinine 0.83, glucose

94. 



ASSESSMENT:  Hypokalemia of unknown etiology.



PLAN:  IV potassium supplementation, once normalized may likely discharge tomorrow.







Job ID:  [id number]

## 2020-08-22 NOTE — DIS
DATE OF ADMISSION:  08/20/2020



DATE OF DISCHARGE:  08/21/2020



ADMISSION DIAGNOSIS:  Hypokalemia.



SECONDARY DIAGNOSES:  Dyslipidemia, gastroesophageal reflux disease, and anxiety

with depression. 



PROCEDURES:  None.



HOSPITAL COURSE:  A 50-year-old female, patient of Dr. Liu with chronic history

of hypokalemia of unknown etiology, for which she has had several prior

hospitalizations, presented to the [facility] Emergency Department at the

direction of her primary care provider secondary to routine followup blood work

revealing a critical potassium level of 2.7.  The patient has been evaluated for

this chronic condition by Endocrinology and Nephrology and has been on chronic

potassium supplementation; most recently dosed at 40 mEq p.o. q.6 hours for the last

5-6 months.  In review of her potassium level readings over the last two years, the

highest reading that I see is 4.2 with numerous critical low values over this time.

The patient was started on gentle IV potassium repletion in the emergency department

and subsequently admitted to the floor.  The patient resumed on gentle IV potassium

repletion and had her basal dose of oral potassium increased to a level of 60 mEq

p.o. q.6 hours.  Her potassium was trended and has returned to a normal level at

this time with potassium at 3.8.  She reports to feel well otherwise and at her

baseline and due to her potassium level returning back to her normal range, is

amenable to discharge home with the specified increase in her basal oral dose. 



DISPOSITION:  The patient will be discharged to her home setting and is encouraged

to follow up with her primary care provider, Dr. Liu next week for repeat

potassium level check. 



DISCHARGE MEDICATIONS:  She will resume her usual home medications with one change

being to take potassium 60 mEq p.o. q.6 hours. 







Job ID:  [id number]

## 2020-09-02 ENCOUNTER — HOSPITAL ENCOUNTER (OUTPATIENT)
Dept: HOSPITAL 92 - TBSIIMAG | Age: 50
Discharge: HOME | End: 2020-09-02
Attending: NEUROLOGICAL SURGERY
Payer: COMMERCIAL

## 2020-09-02 DIAGNOSIS — M47.816: ICD-10-CM

## 2020-09-02 DIAGNOSIS — R20.0: Primary | ICD-10-CM

## 2020-09-02 DIAGNOSIS — M48.07: ICD-10-CM

## 2020-09-02 DIAGNOSIS — M21.371: ICD-10-CM

## 2020-09-02 DIAGNOSIS — M48.061: ICD-10-CM

## 2020-09-02 PROCEDURE — 72100 X-RAY EXAM L-S SPINE 2/3 VWS: CPT

## 2020-09-02 PROCEDURE — 72148 MRI LUMBAR SPINE W/O DYE: CPT

## 2020-09-02 NOTE — RAD
LUMBAR SPINE 3 VIES INCLUDING STANDING FLEXION AND EXTENSION LATERAL VIEWS:

 

HISTORY: 

Right foot drop, numbness in right leg. 

 

COMPARISON: 

8/17/2020.

 

FINDINGS: 

Mild retrolisthesis of L3 on L4 and l4 on L5 slightly more prominent with extension.  No evidence for
 other abnormal translation between flexion and extension.  Generalized spondylosis.

 

IMPRESSION: 

Overall stable exam.  Generalized spondylosis.

 

POS: RRE

## 2020-09-02 NOTE — MRI
LUMBAR SPINE MRI WITHOUT IV CONTRAST:

 

HISTORY: 

Numbness right leg, right foot drop.

 

FINDINGS: 

There are generalized disk desiccation changes and ligament and facet hypertrophic changes throughout
.  Conus medullaris region is unremarkable. 

 

At T11-T12, there is some moderate central canal and lateral recess stenosis from disk-osteophytosis 
and prominent facet arthrosis.

 

At T12-L1 disk:  No significant stenosis.

 

At L1-L2 disk:  Moderate bilateral recess stenosis.

 

L2-L3 disk:  Moderate bilateral recess stenosis and bilateral foraminal stenosis with borderline cent
ral canal stenosis.

 

L3-L4 disk:  Mild bilateral recess and central canal stenosis and foraminal stenosis.

 

L4-L5 disk:  Central annular fissure.  Moderate to severe central canal and lateral recess stenosis a
nd moderate bilateral foraminal stenosis, worse on the left side.

 

L5-S1 disk:  Diffuse disk-osteophytosis without significant central canal or lateral recess stenosis 
but with moderate to severe left foraminal stenosis.  No evidence for abnormal marrow edema.

 

IMPRESSION: 

Multilevel variable severity canal, lateral recess, and foraminal stenosis as above.

 

POS: RRE

## 2020-09-15 ENCOUNTER — HOSPITAL ENCOUNTER (EMERGENCY)
Dept: HOSPITAL 57 - BURERS | Age: 50
End: 2020-09-15
Payer: COMMERCIAL

## 2020-09-15 DIAGNOSIS — E78.2: ICD-10-CM

## 2020-09-15 DIAGNOSIS — E87.6: Primary | ICD-10-CM

## 2020-09-15 DIAGNOSIS — F41.9: ICD-10-CM

## 2020-09-15 DIAGNOSIS — Z79.899: ICD-10-CM

## 2020-09-15 DIAGNOSIS — F17.210: ICD-10-CM

## 2020-09-15 DIAGNOSIS — F32.9: ICD-10-CM

## 2020-09-15 PROCEDURE — 96365 THER/PROPH/DIAG IV INF INIT: CPT

## 2020-09-15 PROCEDURE — 96366 THER/PROPH/DIAG IV INF ADDON: CPT

## 2020-09-15 PROCEDURE — 96375 TX/PRO/DX INJ NEW DRUG ADDON: CPT

## 2020-09-26 ENCOUNTER — HOSPITAL ENCOUNTER (EMERGENCY)
Dept: HOSPITAL 92 - ERS | Age: 50
Discharge: HOME | End: 2020-09-26
Payer: COMMERCIAL

## 2020-09-26 ENCOUNTER — HOSPITAL ENCOUNTER (EMERGENCY)
Dept: HOSPITAL 57 - BURERS | Age: 50
Discharge: ANOTHER HOSPITAL | End: 2020-09-26
Payer: COMMERCIAL

## 2020-09-26 DIAGNOSIS — E87.6: Primary | ICD-10-CM

## 2020-09-26 DIAGNOSIS — F32.9: ICD-10-CM

## 2020-09-26 DIAGNOSIS — E78.00: ICD-10-CM

## 2020-09-26 DIAGNOSIS — F17.210: ICD-10-CM

## 2020-09-26 DIAGNOSIS — R94.31: ICD-10-CM

## 2020-09-26 DIAGNOSIS — E78.5: ICD-10-CM

## 2020-09-26 DIAGNOSIS — F41.9: ICD-10-CM

## 2020-09-26 DIAGNOSIS — Z79.899: ICD-10-CM

## 2020-09-26 LAB
ALBUMIN SERPL BCG-MCNC: 3.8 G/DL (ref 3.5–5)
ALBUMIN SERPL BCG-MCNC: 4.3 G/DL (ref 3.5–5)
ALP SERPL-CCNC: 79 U/L (ref 40–110)
ALP SERPL-CCNC: 81 U/L (ref 40–110)
ALT SERPL W P-5'-P-CCNC: 19 U/L (ref 8–55)
ALT SERPL W P-5'-P-CCNC: 23 U/L (ref 8–55)
ANION GAP SERPL CALC-SCNC: 15 MMOL/L (ref 10–20)
ANION GAP SERPL CALC-SCNC: 16 MMOL/L (ref 10–20)
AST SERPL-CCNC: 21 U/L (ref 5–34)
AST SERPL-CCNC: 21 U/L (ref 5–34)
BASOPHILS # BLD AUTO: 0 THOU/UL (ref 0–0.2)
BASOPHILS # BLD AUTO: 0.1 THOU/UL (ref 0–0.2)
BASOPHILS NFR BLD AUTO: 0.2 % (ref 0–1)
BASOPHILS NFR BLD AUTO: 1.2 % (ref 0–1)
BILIRUB SERPL-MCNC: 0.2 MG/DL (ref 0.2–1.2)
BILIRUB SERPL-MCNC: 0.3 MG/DL (ref 0.2–1.2)
BUN SERPL-MCNC: 11 MG/DL (ref 7–18.7)
BUN SERPL-MCNC: 12 MG/DL (ref 7–18.7)
CALCIUM SERPL-MCNC: 8.6 MG/DL (ref 7.8–10.44)
CALCIUM SERPL-MCNC: 9.6 MG/DL (ref 7.8–10.44)
CHLORIDE SERPL-SCNC: 104 MMOL/L (ref 98–107)
CHLORIDE SERPL-SCNC: 97 MMOL/L (ref 98–107)
CO2 SERPL-SCNC: 20 MMOL/L (ref 22–29)
CO2 SERPL-SCNC: 26 MMOL/L (ref 22–29)
CREAT CL PREDICTED SERPL C-G-VRATE: 0 ML/MIN (ref 70–130)
CREAT CL PREDICTED SERPL C-G-VRATE: 0 ML/MIN (ref 70–130)
EOSINOPHIL # BLD AUTO: 0.3 THOU/UL (ref 0–0.7)
EOSINOPHIL # BLD AUTO: 0.3 THOU/UL (ref 0–0.7)
EOSINOPHIL NFR BLD AUTO: 3.2 % (ref 0–10)
EOSINOPHIL NFR BLD AUTO: 3.5 % (ref 0–10)
GLOBULIN SER CALC-MCNC: 2.5 G/DL (ref 2.4–3.5)
GLOBULIN SER CALC-MCNC: 2.6 G/DL (ref 2.4–3.5)
GLUCOSE SERPL-MCNC: 100 MG/DL (ref 70–105)
GLUCOSE SERPL-MCNC: 86 MG/DL (ref 70–105)
HGB BLD-MCNC: 12 G/DL (ref 12–16)
HGB BLD-MCNC: 12.1 G/DL (ref 12–16)
LIPASE SERPL-CCNC: 33 U/L (ref 8–78)
LYMPHOCYTES # BLD AUTO: 2.1 THOU/UL (ref 1.2–3.4)
LYMPHOCYTES # BLD: 2.5 THOU/UL (ref 1.2–3.4)
LYMPHOCYTES NFR BLD AUTO: 24.8 % (ref 21–51)
LYMPHOCYTES NFR BLD AUTO: 28.2 % (ref 21–51)
MAGNESIUM SERPL-MCNC: 2 MG/DL (ref 1.6–2.6)
MAGNESIUM SERPL-MCNC: 2.2 MG/DL (ref 1.6–2.6)
MCH RBC QN AUTO: 30.6 PG (ref 27–31)
MCH RBC QN AUTO: 32.7 PG (ref 27–31)
MCV RBC AUTO: 92.9 FL (ref 78–98)
MCV RBC AUTO: 95.8 FL (ref 78–98)
MONOCYTES # BLD AUTO: 0.8 THOU/UL (ref 0.11–0.59)
MONOCYTES # BLD AUTO: 1.1 THOU/UL (ref 0.11–0.59)
MONOCYTES NFR BLD AUTO: 12.1 % (ref 0–10)
MONOCYTES NFR BLD AUTO: 9.5 % (ref 0–10)
NEUTROPHILS # BLD AUTO: 5 THOU/UL (ref 1.4–6.5)
NEUTROPHILS # BLD AUTO: 5 THOU/UL (ref 1.4–6.5)
NEUTROPHILS NFR BLD AUTO: 56.2 % (ref 42–75)
NEUTROPHILS NFR BLD AUTO: 61.1 % (ref 42–75)
PLATELET # BLD AUTO: 311 THOU/UL (ref 130–400)
PLATELET # BLD AUTO: 334 THOU/UL (ref 130–400)
POTASSIUM SERPL-SCNC: 2.4 MMOL/L (ref 3.5–5.1)
POTASSIUM SERPL-SCNC: 3 MMOL/L (ref 3.5–5.1)
RBC # BLD AUTO: 3.68 MILL/UL (ref 4.2–5.4)
RBC # BLD AUTO: 3.95 MILL/UL (ref 4.2–5.4)
SODIUM SERPL-SCNC: 136 MMOL/L (ref 136–145)
SODIUM SERPL-SCNC: 137 MMOL/L (ref 136–145)
WBC # BLD AUTO: 8.3 THOU/UL (ref 4.8–10.8)
WBC # BLD AUTO: 8.9 THOU/UL (ref 4.8–10.8)

## 2020-09-26 PROCEDURE — 71045 X-RAY EXAM CHEST 1 VIEW: CPT

## 2020-09-26 PROCEDURE — 83690 ASSAY OF LIPASE: CPT

## 2020-09-26 PROCEDURE — 96366 THER/PROPH/DIAG IV INF ADDON: CPT

## 2020-09-26 PROCEDURE — 93005 ELECTROCARDIOGRAM TRACING: CPT

## 2020-09-26 PROCEDURE — 83735 ASSAY OF MAGNESIUM: CPT

## 2020-09-26 PROCEDURE — 96365 THER/PROPH/DIAG IV INF INIT: CPT

## 2020-09-26 PROCEDURE — 84484 ASSAY OF TROPONIN QUANT: CPT

## 2020-09-26 PROCEDURE — 85025 COMPLETE CBC W/AUTO DIFF WBC: CPT

## 2020-09-26 PROCEDURE — 36415 COLL VENOUS BLD VENIPUNCTURE: CPT

## 2020-09-26 PROCEDURE — 94760 N-INVAS EAR/PLS OXIMETRY 1: CPT

## 2020-09-26 PROCEDURE — 80053 COMPREHEN METABOLIC PANEL: CPT

## 2020-09-26 NOTE — RAD
EXAM: Single view of the chest



HISTORY:   Hypokalemia and abnormal EKG



COMPARISON: 2/14/2020



FINDINGS: Single view of the chest shows a normal sized cardiomediastinal silhouette. There is no silver
dence of consolidation, mass, or pleural effusion. Degenerative changes are seen in the spine.



IMPRESSION: No evidence of acute cardiopulmonary disease



Reported By: Marquise Major 

Electronically Signed:  9/26/2020 12:23 PM

## 2022-05-31 ENCOUNTER — HOSPITAL ENCOUNTER (OUTPATIENT)
Dept: HOSPITAL 92 - CSHLAB | Age: 52
Discharge: HOME | End: 2022-05-31
Attending: SURGERY
Payer: COMMERCIAL

## 2022-05-31 DIAGNOSIS — K60.3: ICD-10-CM

## 2022-05-31 DIAGNOSIS — Z20.822: Primary | ICD-10-CM

## 2022-05-31 PROCEDURE — U0005 INFEC AGEN DETEC AMPLI PROBE: HCPCS

## 2022-05-31 PROCEDURE — U0003 INFECTIOUS AGENT DETECTION BY NUCLEIC ACID (DNA OR RNA); SEVERE ACUTE RESPIRATORY SYNDROME CORONAVIRUS 2 (SARS-COV-2) (CORONAVIRUS DISEASE [COVID-19]), AMPLIFIED PROBE TECHNIQUE, MAKING USE OF HIGH THROUGHPUT TECHNOLOGIES AS DESCRIBED BY CMS-2020-01-R: HCPCS

## 2022-06-02 ENCOUNTER — HOSPITAL ENCOUNTER (OUTPATIENT)
Dept: HOSPITAL 92 - CSHSDC | Age: 52
End: 2022-06-02
Attending: SURGERY
Payer: COMMERCIAL

## 2022-06-02 VITALS — BODY MASS INDEX: 20.9 KG/M2

## 2022-06-02 DIAGNOSIS — Z53.09: ICD-10-CM

## 2022-06-02 DIAGNOSIS — K60.2: Primary | ICD-10-CM

## 2022-06-02 LAB — POTASSIUM SERPL-SCNC: 2.3 MMOL/L (ref 3.5–5.1)

## 2022-06-02 PROCEDURE — 84132 ASSAY OF SERUM POTASSIUM: CPT

## 2022-06-02 PROCEDURE — S0020 INJECTION, BUPIVICAINE HYDRO: HCPCS

## 2022-06-02 PROCEDURE — 36415 COLL VENOUS BLD VENIPUNCTURE: CPT

## 2022-06-20 ENCOUNTER — HOSPITAL ENCOUNTER (OUTPATIENT)
Dept: HOSPITAL 92 - CSHLAB | Age: 52
Discharge: HOME | End: 2022-06-20
Attending: SURGERY
Payer: COMMERCIAL

## 2022-06-20 DIAGNOSIS — K60.3: ICD-10-CM

## 2022-06-20 DIAGNOSIS — Z20.822: Primary | ICD-10-CM

## 2022-06-20 PROCEDURE — U0005 INFEC AGEN DETEC AMPLI PROBE: HCPCS

## 2022-06-20 PROCEDURE — U0003 INFECTIOUS AGENT DETECTION BY NUCLEIC ACID (DNA OR RNA); SEVERE ACUTE RESPIRATORY SYNDROME CORONAVIRUS 2 (SARS-COV-2) (CORONAVIRUS DISEASE [COVID-19]), AMPLIFIED PROBE TECHNIQUE, MAKING USE OF HIGH THROUGHPUT TECHNOLOGIES AS DESCRIBED BY CMS-2020-01-R: HCPCS

## 2022-06-23 ENCOUNTER — HOSPITAL ENCOUNTER (OUTPATIENT)
Dept: HOSPITAL 92 - CSHSDC | Age: 52
Discharge: HOME | End: 2022-06-23
Attending: SURGERY
Payer: COMMERCIAL

## 2022-06-23 VITALS — BODY MASS INDEX: 21.4 KG/M2

## 2022-06-23 DIAGNOSIS — Z86.010: ICD-10-CM

## 2022-06-23 DIAGNOSIS — K50.90: ICD-10-CM

## 2022-06-23 DIAGNOSIS — K60.2: Primary | ICD-10-CM

## 2022-06-23 DIAGNOSIS — Z79.899: ICD-10-CM

## 2022-06-23 DIAGNOSIS — F17.210: ICD-10-CM

## 2022-06-23 DIAGNOSIS — K64.8: ICD-10-CM

## 2022-06-23 DIAGNOSIS — E78.00: ICD-10-CM

## 2022-06-23 DIAGNOSIS — Z91.048: ICD-10-CM

## 2022-06-23 DIAGNOSIS — Z88.5: ICD-10-CM

## 2022-06-23 DIAGNOSIS — Z91.040: ICD-10-CM

## 2022-06-23 DIAGNOSIS — K64.4: ICD-10-CM

## 2022-06-23 DIAGNOSIS — D64.9: ICD-10-CM

## 2022-06-23 LAB — POTASSIUM SERPL-SCNC: 2.7 MMOL/L (ref 3.5–5.1)

## 2022-06-23 PROCEDURE — 36415 COLL VENOUS BLD VENIPUNCTURE: CPT

## 2022-06-23 PROCEDURE — S0020 INJECTION, BUPIVICAINE HYDRO: HCPCS

## 2022-06-23 PROCEDURE — 84132 ASSAY OF SERUM POTASSIUM: CPT

## 2022-06-23 PROCEDURE — 0D8R3ZZ DIVISION OF ANAL SPHINCTER, PERCUTANEOUS APPROACH: ICD-10-PCS | Performed by: SURGERY

## 2022-06-23 PROCEDURE — 93005 ELECTROCARDIOGRAM TRACING: CPT

## 2022-06-23 PROCEDURE — 93010 ELECTROCARDIOGRAM REPORT: CPT
